# Patient Record
Sex: FEMALE | Race: ASIAN | NOT HISPANIC OR LATINO | Employment: UNEMPLOYED | URBAN - METROPOLITAN AREA
[De-identification: names, ages, dates, MRNs, and addresses within clinical notes are randomized per-mention and may not be internally consistent; named-entity substitution may affect disease eponyms.]

---

## 2017-03-13 ENCOUNTER — COMMUNICATION - HEALTHEAST (OUTPATIENT)
Dept: FAMILY MEDICINE | Facility: CLINIC | Age: 27
End: 2017-03-13

## 2017-03-15 ENCOUNTER — OFFICE VISIT - HEALTHEAST (OUTPATIENT)
Dept: FAMILY MEDICINE | Facility: CLINIC | Age: 27
End: 2017-03-15

## 2017-03-15 DIAGNOSIS — M54.50 ACUTE BILATERAL LOW BACK PAIN WITHOUT SCIATICA: ICD-10-CM

## 2017-03-15 DIAGNOSIS — R10.30 LOWER ABDOMINAL PAIN: ICD-10-CM

## 2017-03-15 ASSESSMENT — MIFFLIN-ST. JEOR: SCORE: 1325.29

## 2017-07-06 ENCOUNTER — OFFICE VISIT - HEALTHEAST (OUTPATIENT)
Dept: FAMILY MEDICINE | Facility: CLINIC | Age: 27
End: 2017-07-06

## 2017-07-06 DIAGNOSIS — Z34.90 PREGNANCY, UNSPECIFIED GESTATIONAL AGE: ICD-10-CM

## 2017-07-06 DIAGNOSIS — O21.9 VOMITING PREGNANCY: ICD-10-CM

## 2017-07-06 DIAGNOSIS — Z33.1 PREGNANT STATE, INCIDENTAL: ICD-10-CM

## 2017-07-06 DIAGNOSIS — N91.2 AMENORRHEA: ICD-10-CM

## 2017-07-06 ASSESSMENT — MIFFLIN-ST. JEOR: SCORE: 1316.22

## 2017-07-11 ENCOUNTER — COMMUNICATION - HEALTHEAST (OUTPATIENT)
Dept: FAMILY MEDICINE | Facility: CLINIC | Age: 27
End: 2017-07-11

## 2017-07-18 ENCOUNTER — HOSPITAL ENCOUNTER (OUTPATIENT)
Dept: ULTRASOUND IMAGING | Facility: HOSPITAL | Age: 27
Discharge: HOME | End: 2017-07-18
Attending: FAMILY MEDICINE

## 2017-07-19 ENCOUNTER — COMMUNICATION - HEALTHEAST (OUTPATIENT)
Dept: FAMILY MEDICINE | Facility: CLINIC | Age: 27
End: 2017-07-19

## 2017-08-08 ENCOUNTER — PRENATAL OFFICE VISIT - HEALTHEAST (OUTPATIENT)
Dept: FAMILY MEDICINE | Facility: CLINIC | Age: 27
End: 2017-08-08

## 2017-08-08 DIAGNOSIS — Z34.90 PREGNANCY, UNSPECIFIED GESTATIONAL AGE: ICD-10-CM

## 2017-08-08 DIAGNOSIS — Z34.90 PREGNANCY: ICD-10-CM

## 2017-08-08 DIAGNOSIS — Z34.81 ENCOUNTER FOR SUPERVISION OF OTHER NORMAL PREGNANCY IN FIRST TRIMESTER: ICD-10-CM

## 2017-08-08 LAB — HIV 1+2 AB+HIV1 P24 AG SERPL QL IA: NEGATIVE

## 2017-08-08 ASSESSMENT — MIFFLIN-ST. JEOR: SCORE: 1318.49

## 2017-08-09 LAB
HBV SURFACE AG SERPL QL IA: NEGATIVE
SYPHILIS RPR SCREEN - HISTORICAL: NORMAL

## 2017-08-11 ENCOUNTER — AMBULATORY - HEALTHEAST (OUTPATIENT)
Dept: FAMILY MEDICINE | Facility: CLINIC | Age: 27
End: 2017-08-11

## 2017-08-11 DIAGNOSIS — Z34.90 PREGNANCY: ICD-10-CM

## 2017-09-07 ENCOUNTER — PRENATAL OFFICE VISIT - HEALTHEAST (OUTPATIENT)
Dept: FAMILY MEDICINE | Facility: CLINIC | Age: 27
End: 2017-09-07

## 2017-09-07 ENCOUNTER — RECORDS - HEALTHEAST (OUTPATIENT)
Dept: ADMINISTRATIVE | Facility: OTHER | Age: 27
End: 2017-09-07

## 2017-09-07 DIAGNOSIS — Z23 NEED FOR IMMUNIZATION AGAINST INFLUENZA: ICD-10-CM

## 2017-09-07 DIAGNOSIS — Z3A.17 17 WEEKS GESTATION OF PREGNANCY: ICD-10-CM

## 2017-09-07 ASSESSMENT — MIFFLIN-ST. JEOR: SCORE: 1318.49

## 2017-09-28 ENCOUNTER — HOSPITAL ENCOUNTER (OUTPATIENT)
Dept: ULTRASOUND IMAGING | Facility: HOSPITAL | Age: 27
Discharge: HOME | End: 2017-09-28
Attending: FAMILY MEDICINE

## 2017-09-28 DIAGNOSIS — Z3A.17 17 WEEKS GESTATION OF PREGNANCY: ICD-10-CM

## 2017-10-05 ENCOUNTER — PRENATAL OFFICE VISIT - HEALTHEAST (OUTPATIENT)
Dept: FAMILY MEDICINE | Facility: CLINIC | Age: 27
End: 2017-10-05

## 2017-10-05 DIAGNOSIS — Z34.90 PREGNANCY: ICD-10-CM

## 2017-10-05 ASSESSMENT — MIFFLIN-ST. JEOR: SCORE: 1341.17

## 2017-10-12 ENCOUNTER — HOSPITAL ENCOUNTER (OUTPATIENT)
Dept: ULTRASOUND IMAGING | Facility: HOSPITAL | Age: 27
Discharge: HOME | End: 2017-10-12
Attending: FAMILY MEDICINE

## 2017-10-12 DIAGNOSIS — Z34.90 PREGNANCY: ICD-10-CM

## 2017-11-03 ENCOUNTER — PRENATAL OFFICE VISIT - HEALTHEAST (OUTPATIENT)
Dept: FAMILY MEDICINE | Facility: CLINIC | Age: 27
End: 2017-11-03

## 2017-11-03 DIAGNOSIS — Z34.90 PREGNANCY: ICD-10-CM

## 2017-11-03 ASSESSMENT — MIFFLIN-ST. JEOR: SCORE: 1363.85

## 2017-12-12 ENCOUNTER — PRENATAL OFFICE VISIT - HEALTHEAST (OUTPATIENT)
Dept: FAMILY MEDICINE | Facility: CLINIC | Age: 27
End: 2017-12-12

## 2017-12-12 DIAGNOSIS — Z34.90 PREGNANCY: ICD-10-CM

## 2017-12-12 ASSESSMENT — MIFFLIN-ST. JEOR: SCORE: 1359.31

## 2017-12-13 LAB — SYPHILIS RPR SCREEN - HISTORICAL: NORMAL

## 2017-12-28 ENCOUNTER — PRENATAL OFFICE VISIT - HEALTHEAST (OUTPATIENT)
Dept: FAMILY MEDICINE | Facility: CLINIC | Age: 27
End: 2017-12-28

## 2017-12-28 DIAGNOSIS — Z34.90 PREGNANCY: ICD-10-CM

## 2017-12-28 ASSESSMENT — MIFFLIN-ST. JEOR: SCORE: 1364.98

## 2018-01-18 ENCOUNTER — COMMUNICATION - HEALTHEAST (OUTPATIENT)
Dept: FAMILY MEDICINE | Facility: CLINIC | Age: 28
End: 2018-01-18

## 2018-01-29 ENCOUNTER — PRENATAL OFFICE VISIT - HEALTHEAST (OUTPATIENT)
Dept: FAMILY MEDICINE | Facility: CLINIC | Age: 28
End: 2018-01-29

## 2018-01-29 DIAGNOSIS — Z34.90 PREGNANCY: ICD-10-CM

## 2018-01-29 ASSESSMENT — MIFFLIN-ST. JEOR: SCORE: 1386.53

## 2018-01-31 LAB
ALLERGIC TO PENICILLIN: NO
GP B STREP DNA SPEC QL NAA+PROBE: NEGATIVE

## 2018-02-07 ENCOUNTER — PRENATAL OFFICE VISIT - HEALTHEAST (OUTPATIENT)
Dept: FAMILY MEDICINE | Facility: CLINIC | Age: 28
End: 2018-02-07

## 2018-02-07 DIAGNOSIS — Z34.90 PREGNANCY: ICD-10-CM

## 2018-02-07 ASSESSMENT — MIFFLIN-ST. JEOR: SCORE: 1383.8

## 2018-02-13 ENCOUNTER — HOME CARE/HOSPICE - HEALTHEAST (OUTPATIENT)
Dept: HOME HEALTH SERVICES | Facility: HOME HEALTH | Age: 28
End: 2018-02-13

## 2018-02-14 ENCOUNTER — HOME CARE/HOSPICE - HEALTHEAST (OUTPATIENT)
Dept: HOME HEALTH SERVICES | Facility: HOME HEALTH | Age: 28
End: 2018-02-14

## 2018-03-27 ENCOUNTER — COMMUNICATION - HEALTHEAST (OUTPATIENT)
Dept: FAMILY MEDICINE | Facility: CLINIC | Age: 28
End: 2018-03-27

## 2018-03-27 ENCOUNTER — OFFICE VISIT - HEALTHEAST (OUTPATIENT)
Dept: FAMILY MEDICINE | Facility: CLINIC | Age: 28
End: 2018-03-27

## 2018-03-27 DIAGNOSIS — Z12.4 SCREENING FOR CERVICAL CANCER: ICD-10-CM

## 2018-03-27 DIAGNOSIS — Z30.011 ENCOUNTER FOR INITIAL PRESCRIPTION OF CONTRACEPTIVE PILLS: ICD-10-CM

## 2018-03-27 LAB
HCG UR QL: NEGATIVE
SP GR UR STRIP: 1.02 (ref 1–1.03)

## 2018-03-27 RX ORDER — SWAB
1 SWAB, NON-MEDICATED MISCELLANEOUS DAILY
Qty: 90 TABLET | Refills: 10 | Status: SHIPPED | OUTPATIENT
Start: 2018-03-27 | End: 2021-07-16

## 2018-03-27 RX ORDER — NORGESTIMATE AND ETHINYL ESTRADIOL 0.25-0.035
1 KIT ORAL DAILY
Qty: 3 PACKAGE | Refills: 4 | Status: SHIPPED | OUTPATIENT
Start: 2018-03-27 | End: 2021-07-16

## 2018-03-27 ASSESSMENT — MIFFLIN-ST. JEOR: SCORE: 1336.63

## 2018-03-28 LAB
BKR LAB AP ABNORMAL BLEEDING: NO
BKR LAB AP BIRTH CONTROL/HORMONES: NORMAL
BKR LAB AP CERVICAL APPEARANCE: NORMAL
BKR LAB AP GYN ADEQUACY: NORMAL
BKR LAB AP GYN INTERPRETATION: NORMAL
BKR LAB AP HPV REFLEX: NORMAL
BKR LAB AP LMP: NORMAL
BKR LAB AP PATIENT STATUS: NORMAL
BKR LAB AP PREVIOUS ABNORMAL: NORMAL
BKR LAB AP PREVIOUS NORMAL: 2015
HIGH RISK?: NO
PATH REPORT.COMMENTS IMP SPEC: NORMAL
RESULT FLAG (HE HISTORICAL CONVERSION): NORMAL

## 2018-03-29 ENCOUNTER — COMMUNICATION - HEALTHEAST (OUTPATIENT)
Dept: FAMILY MEDICINE | Facility: CLINIC | Age: 28
End: 2018-03-29

## 2018-09-24 ENCOUNTER — AMBULATORY - HEALTHEAST (OUTPATIENT)
Dept: FAMILY MEDICINE | Facility: CLINIC | Age: 28
End: 2018-09-24

## 2018-09-24 DIAGNOSIS — Z00.00 HEALTHY ADULT ON ROUTINE PHYSICAL EXAMINATION: ICD-10-CM

## 2019-03-08 ENCOUNTER — OFFICE VISIT - HEALTHEAST (OUTPATIENT)
Dept: FAMILY MEDICINE | Facility: CLINIC | Age: 29
End: 2019-03-08

## 2019-03-08 DIAGNOSIS — Z30.017 NEXPLANON INSERTION: ICD-10-CM

## 2019-03-08 DIAGNOSIS — Z23 NEED FOR VACCINATION: ICD-10-CM

## 2019-03-08 DIAGNOSIS — Z00.00 HEALTHY ADULT ON ROUTINE PHYSICAL EXAMINATION: ICD-10-CM

## 2019-03-08 DIAGNOSIS — Z30.017 ENCOUNTER FOR INITIAL PRESCRIPTION OF IMPLANTABLE SUBDERMAL CONTRACEPTIVE: ICD-10-CM

## 2019-03-08 LAB — HCG UR QL: NEGATIVE

## 2019-03-08 RX ORDER — ACETAMINOPHEN 325 MG/1
TABLET ORAL
Qty: 120 TABLET | Refills: 11 | Status: SHIPPED | OUTPATIENT
Start: 2019-03-08 | End: 2021-07-16

## 2019-03-08 ASSESSMENT — MIFFLIN-ST. JEOR: SCORE: 1318.49

## 2019-11-12 ENCOUNTER — AMBULATORY - HEALTHEAST (OUTPATIENT)
Dept: NURSING | Facility: CLINIC | Age: 29
End: 2019-11-12

## 2021-03-02 ENCOUNTER — OFFICE VISIT - HEALTHEAST (OUTPATIENT)
Dept: FAMILY MEDICINE | Facility: CLINIC | Age: 31
End: 2021-03-02

## 2021-03-02 DIAGNOSIS — L73.9 FOLLICULITIS: ICD-10-CM

## 2021-04-10 ENCOUNTER — AMBULATORY - HEALTHEAST (OUTPATIENT)
Dept: NURSING | Facility: CLINIC | Age: 31
End: 2021-04-10

## 2021-05-30 VITALS — WEIGHT: 145.5 LBS | BODY MASS INDEX: 26.78 KG/M2 | HEIGHT: 62 IN

## 2021-05-31 VITALS — HEIGHT: 62 IN | WEIGHT: 154 LBS | BODY MASS INDEX: 28.34 KG/M2

## 2021-05-31 VITALS — WEIGHT: 158 LBS | HEIGHT: 62 IN | BODY MASS INDEX: 29.08 KG/M2

## 2021-05-31 VITALS
HEIGHT: 62 IN | BODY MASS INDEX: 26.77 KG/M2 | BODY MASS INDEX: 26.5 KG/M2 | WEIGHT: 144 LBS | BODY MASS INDEX: 26.77 KG/M2 | WEIGHT: 144 LBS | HEIGHT: 62 IN

## 2021-05-31 VITALS — HEIGHT: 62 IN | BODY MASS INDEX: 26.41 KG/M2 | WEIGHT: 143.5 LBS

## 2021-05-31 VITALS — HEIGHT: 62 IN | BODY MASS INDEX: 29.26 KG/M2 | WEIGHT: 159 LBS

## 2021-05-31 VITALS — WEIGHT: 153 LBS | BODY MASS INDEX: 28.16 KG/M2 | HEIGHT: 62 IN

## 2021-05-31 VITALS — WEIGHT: 144 LBS | BODY MASS INDEX: 26.5 KG/M2 | HEIGHT: 62 IN

## 2021-05-31 VITALS — WEIGHT: 149 LBS | BODY MASS INDEX: 27.42 KG/M2 | HEIGHT: 62 IN

## 2021-05-31 VITALS — HEIGHT: 62 IN | BODY MASS INDEX: 28.39 KG/M2 | WEIGHT: 154.25 LBS

## 2021-06-01 VITALS — HEIGHT: 62 IN | BODY MASS INDEX: 27.23 KG/M2 | WEIGHT: 148 LBS

## 2021-06-02 VITALS — HEIGHT: 62 IN | WEIGHT: 144 LBS | BODY MASS INDEX: 26.5 KG/M2

## 2021-06-09 NOTE — PROGRESS NOTES
Assessment: /    Plan:    1. Lower abdominal pain  Urinalysis-UC if Indicated   2. Acute bilateral low back pain without sciatica  ibuprofen (ADVIL,MOTRIN) 600 MG tablet       Avoid bending twisting or leaning at the waist.  Recheck if symptoms are not improving.  Patient was seen with Nabila , Harman Ku.    Subjective:    HPI:  Harman Abbott is a 26-year-old female presenting with bilateral low back pain, and also lower abdominal pain.  Symptoms began 3 weeks ago.  She notes pain after she has been sitting for a period of time.  She has not been taking medication for this.  She has not had any injury involving the back or abdomen.    Review of Systems: No fever, dysuria, increased urinary frequency, vomiting, melena.      Current Outpatient Prescriptions   Medication Sig Dispense Refill     ARTIFICIAL TEARS, POLYVIN ALC, 1.4 % ophthalmic solution   0     condoms latex lubricated (CONDOMS-RAFFI LUBRICATED) Rachael Use as directed to prevent pregnancy 24 each 12     docusate sodium (COLACE) 100 MG capsule Take 1 capsule (100 mg total) by mouth 2 (two) times a day as needed for constipation. 10 capsule 0     lubricants (PERSONAL LUBRICANT) Soln Use vaginally for dryness 70.5 mL 0     ibuprofen (ADVIL,MOTRIN) 600 MG tablet Take 1 tablet (600 mg total) by mouth 3 (three) times a day as needed for pain. 50 tablet 5     MAPAP, ACETAMINOPHEN, 325 mg tablet TAKE 1 TABLET (325 MG TOTAL)  BY MOUTH EVERY 6 (SIX) HOURS AS NEEDED FO R PAIN. 120 tablet 11     PRENATAL VIT W-CA,FE,FA,<1 MG, (PRENATAL VITAMIN ORAL) Take 1 tablet by mouth daily.       No current facility-administered medications for this visit.          Objective:    Vitals:    03/15/17 1508   BP: 90/60   Pulse: 98   Resp: 16   Temp: 98.6  F (37  C)   SpO2: 98%       Gen:  NAD, VSS  Lungs:  normal  Heart:  normal  Abdomen:  No HSM or mass.  Slight suprapubic tenderness.  No rebound or guarding.  Back with no CVA tenderness, no midline thoracic or lumbar  spinal tenderness.  Bilateral lumbar paraspinal tenderness.  Straight leg raise normal bilateral    UA negative    ADDITIONAL HISTORY SUMMARIZED (2): None.  DECISION TO OBTAIN EXTRA INFORMATION (1): None.   RADIOLOGY TESTS (1): None.  LABS (1): UA.  MEDICINE TESTS (1): None.  INDEPENDENT REVIEW (2 each): None.     Total Data Points: 1

## 2021-06-12 NOTE — PROGRESS NOTES
Doing well.  No problems.    Quad screen drawn today  US ordered today.    Reviewed signs of pre eclampsia.    Reviewed fetal movement.  Reviewed travel plans  Influenza vaccine given today.

## 2021-06-12 NOTE — PROGRESS NOTES
PRENATAL VISIT   FIRST OBSTETRICAL EXAM - OB    Assessment / Impression     multip at 13 w 2 days.    Desires bottlefeeding  Desires quad screen.   Normal first prenatal visit at 13w2d  Discussed orientation, general information, lifestyle, nutrition, exercise,warning signs, resources, lab testing, risk screening and discussed cystic fibrosis screening with patient.  Questions answered.    Plan:     Encouraged regular physical acitivty.     Initial labs drawn.  Prenatal vitamins.  Problem list reviewed and updated.  Genetic screening test options discussed:  Patient elects quad screening  Role of ultrasound in pregnancy discussed; fetal survey: requested.  Follow up: Return in 4 weeks (on 2017).  Needs quad screen at next visit.      Subjective:    Harman Abbott is a 26 y.o.  here today for her First Obstetrical Exam.   OB History    Para Term  AB Living   3 2 2   2   SAB TAB Ectopic Multiple Live Births       2      # Outcome Date GA Lbr Wilfredo/2nd Weight Sex Delivery Anes PTL Lv   3 Current            2 Term 02/16/15 38w3d 19:32 / 00:10 5 lb 9 oz (2.523 kg) F Vag-Spont None N LYDIA   1 Term  40w0d 07:00 6 lb 6.3 oz (2.9 kg) F Vag-Spont None  LYDIA      Birth Comments: had long prodromal episode          Expected Date of Delivery: 2018, by Ultrasound    No past medical history on file.  No past surgical history on file.  Social History   Substance Use Topics     Smoking status: Never Smoker     Smokeless tobacco: Never Used     Alcohol use No     Current Outpatient Prescriptions   Medication Sig Dispense Refill     prenatal vit-iron fum-folic ac (PRENATAL VITAMIN) 27 mg iron- 0.8 mg Tab Take 1 tablet by mouth daily. 100 tablet 3     ARTIFICIAL TEARS, POLYVIN ALC, 1.4 % ophthalmic solution   0     condoms latex lubricated (CONDOMS-RAFFI LUBRICATED) Rachael Use as directed to prevent pregnancy 24 each 12     docusate sodium (COLACE) 100 MG capsule Take 1 capsule (100 mg total) by mouth 2 (two) times a  "day as needed for constipation. 10 capsule 0     lubricants (PERSONAL LUBRICANT) Soln Use vaginally for dryness 70.5 mL 0     MAPAP, ACETAMINOPHEN, 325 mg tablet TAKE 1 TABLET (325 MG TOTAL)  BY MOUTH EVERY 6 (SIX) HOURS AS NEEDED FO R PAIN. 120 tablet 11     prenat.vits,pat,min-iron-folic Tab Take 1 tablet daily 100 each 3     No current facility-administered medications for this visit.      No Known Allergies          High Risk Behavior: None    Review of Systems  General:  Denies problem  Eyes: Denies problem  Ears/Nose/Throat: Denies problem  Cardiovascular: Denies problem  Respiratory:  Denies problem  Gastrointestinal:  Denies problem, Genitourinary: Denies problem  Musculoskeletal:  Denies problem  Skin: Denies problem  Neurologic: Denies problem  Psychiatric: Denies problem  Endocrine: Denies problem  Heme/Lymphatic: Denies problem   Allergic/Immunologic: Denies problem       Objective:   Objective    Vitals:    08/08/17 1218   BP: 106/54   Pulse: 100   Resp: 16   Temp: 99.5  F (37.5  C)   TempSrc: Oral   Weight: 144 lb (65.3 kg)   Height: 5' 1.5\" (1.562 m)     Physical Exam:  General Appearance: Alert, cooperative, no distress, appears stated age  Head: Normocephalic, without obvious abnormality, atraumatic  Eyes: PERRL, conjunctiva/corneas clear, EOM's intact  Ears: Normal TM's and external ear canals, both ears  Nose: Nares normal, septum midline,mucosa normal, no drainage  Throat: Lips, mucosa, and tongue normal; teeth and gums normal  Neck: Supple, symmetrical, trachea midline, no adenopathy;  thyroid: not enlarged, symmetric, no tenderness/mass/nodules; no carotid bruit or JVD  Back: Symmetric, no curvature, ROM normal, no CVA tenderness  Lungs: Clear to auscultation bilaterally, respirations unlabored  Breasts: No breast masses, tenderness, asymmetry, or nipple discharge.  Heart: Regular rate and rhythm, S1 and S2 normal, no murmur, rub, or gallop, Abdomen: Soft, non-tender, bowel sounds active all " four quadrants,  no masses, no organomegaly  Pelvic:Normally developed genitalia with no external lesions or eruptions. Vagina and cervix show no lesions, inflammation, discharge or tenderness. No cystocele, No rectocele. Uterus appropriate size.  No adnexal mass or tenderness.  Extremities: Extremities normal, atraumatic, no cyanosis or edema  Skin: Skin color, texture, turgor normal, no rashes or lesions  Lymph nodes: Cervical, supraclavicular, and axillary nodes normal  Neurologic: Normal     Lab:   Results for orders placed or performed in visit on 07/06/17   Pregnancy, Urine   Result Value Ref Range    Pregnancy Test, Urine Positive (!) Negative

## 2021-06-13 NOTE — PROGRESS NOTES
Doing well.  No problems.  No bleeding, cramping, or lof.    Repeat US of babys face was normal.    Desires natural delivery.    Reviewed signs of  labor  Has carseat from her last baby  1 hour gct, uai, hgb, syphilis at next visit.

## 2021-06-14 NOTE — PROGRESS NOTES
1 hour done today.    Baby is moving.    No bleeding, cramping, or lof.    No painful ctx.    tdap done today.

## 2021-06-15 NOTE — PROGRESS NOTES
Harman Abbott is a 30 y.o. female who is being evaluated via a billable telephone visit.      What phone number would you like to be contacted at? [phone]  How would you like to obtain your AVS? AVS Preference: Mail a copy.    Assessment & Plan     Harman was seen today for rash.    Diagnoses and all orders for this visit:    Folliculitis  -     cephalexin (KEFLEX) 500 MG capsule; Take 1 capsule (500 mg total) by mouth 3 (three) times a day for 5 days.      Be seen at office if worse, or not resolving.             Return in about 1 year (around 3/2/2022) for Annual physical.    Kirt Garza MD  [facility]    Subjective   Harman Abbott is 30 y.o. and presents today for the following health issues   HPI     Rash began yesterday on back and neck.  Pimples.  Itchy. Spreading.  Not otherwise sick.      Current Outpatient Medications   Medication Sig Dispense Refill     acetaminophen (MAPAP, ACETAMINOPHEN,) 325 MG tablet TAKE 1 TABLET (325 MG TOTAL)  BY MOUTH EVERY 6 (SIX) HOURS AS NEEDED FO R PAIN. 120 tablet 11     ARTIFICIAL TEARS, POLYVIN ALC, 1.4 % ophthalmic solution   0     cephalexin (KEFLEX) 500 MG capsule Take 1 capsule (500 mg total) by mouth 3 (three) times a day for 5 days. 15 capsule 0     condoms latex lubricated (CONDOMS-RAFFI LUBRICATED) Rachael Use as directed to prevent pregnancy 24 each 12     docusate sodium (COLACE) 100 MG capsule Take 1 capsule (100 mg total) by mouth 2 (two) times a day as needed for constipation. 10 capsule 0     etonogestrel (NEXPLANON) 68 mg Impl implant 1 each by Subdermal route once. Lot: [id number]  Exp: 08/15/2021  NDC: 9138-4580-22       ferrous sulfate 325 (65 FE) MG tablet Take 1 tablet (325 mg total) by mouth daily. 30 tablet 1     lubricants (PERSONAL LUBRICANT) Soln Use vaginally for dryness 70.5 mL 0     norgestimate-ethinyl estradiol (SPRINTEC, 28,) 0.25-35 mg-mcg per tablet Take 1 tablet by mouth daily. 3 Package 4     prenatal vitamin  iron-folic acid 27mg-0.8mg (PRENATAL S) 27 mg iron- 800 mcg Tab tablet Take 1 tablet by mouth daily. 90 tablet 10     No current facility-administered medications for this visit.          Review of Systems  No fever, cough or rhinorrhea.      Objective       Vitals:  No vitals were obtained today due to virtual visit.    Physical Exam              Phone call duration: 8 minutes

## 2021-06-15 NOTE — PROGRESS NOTES
gbs done today.    She says she had difficulty with transportation, so she was not seen for a few weeks.    She does not have any painful ctx.  She has occasional stephan nava.    No headaches or vision changes.  No swelling.    Reviewed when to go to the hospital.    Reviewed on call coverage as I am out of town next week.    Baby is moving well.

## 2021-06-15 NOTE — PROGRESS NOTES
Some stephan nava ctx.  No bleeding, cramping, or lof.    Reviewed when to go to the hospital.    She does have her carseat.    She does have a way to get to the hospital.   Will create a paper to get her to the hospital if need be.    Reviewed baby cares including skin to skin, delayed cord clamping, encouraged breastfeeding, vitamin k, hepatitis b vaccine, eye ointment, 24 hour testing.    GBS at next visit.

## 2021-06-15 NOTE — PROGRESS NOTES
"SUBJECTIVE:  Harman Abbott is a 27 y.o. female  at 39w3d by 10 wk US. Estimated Date of Delivery: 18.  She is doing well. She denies vaginal bleeding, vaginal burning/itching/pain or unusual discharge, or urinary symptoms. Fetal movement is present. She denies regular or painful contractions.  Concerns: none  ROS  No headaches, chest pain, RUQ pain, edema.  OBJECTIVE:  Blood pressure 96/58, pulse 100, temperature 99  F (37.2  C), temperature source Oral, resp. rate 20, height 5' 1.61\" (1.565 m), weight 158 lb (71.7 kg), last menstrual period 2017, not currently breastfeeding.  Gen: Comfortable, no acute distress.  Abd: Gravid, non-tender  See OB Vitals flowsheet.  ASSESSMENT/PLAN:  - at 39w3d by 10 wk US:   Prenatal labs reviewed and Normal.   GBS status is negative  Peripartum Anesthesia: the patient does NOT desire an epidural during labor.   Post-partum Contraception: nexplanon  Breast/Bottle: bottlefeed   Reviewed plans for getting to the hospital.  RTC in 1 week or sooner with problems.     Visit completed along with assistance of Nabila .    Lucia Daily MD    "

## 2021-06-16 NOTE — PROGRESS NOTES
"S:  28 yo female who is here for a post partum visit.  She denies any emotional problems.  She is sleeping ok.  She is doing all breastfeeding.    She is eating well.  He bowels and bladder are doing well.  She has not yet had a period.   She is thinking she would like to use ocp righ now for birth control.  She would like to change to nexplanon after she has a period.  She cannot express why she would like to do it this way.    She is due for a pap smear.      O:  BP 96/64  Pulse 97  Temp 98.4  F (36.9  C) (Oral)   Resp 20  Ht 5' 1.5\" (1.562 m)  Wt 148 lb (67.1 kg)  LMP 04/30/2017 (Approximate)  SpO2 98%  Breastfeeding? Yes  BMI 27.51 kg/m2  Gen: no acute distress  Neck:  Supple, no lad, no thyromegaly or nodules.    Heart:  Regular rate and rhythm.  No m/r/g  Breast exam is normal  Lungs: cta bilaterally, no wheezes or rhonchi.  Good air inspiration  Abdomen:  No masses or organomegaly  Extremities:  No edema.     Skin:  No rashes  Genitourinary Exam:   Vulva: normal skin.  No lesions noted.  Nontender.    Urethral meatus: normal size and location, no lesions or discharge   Urethra: no tenderness or masses   Bladder: no fullness or tenderness   Vagina: normal appearance, physiologic discharge. No evidence of cystocele or rectocele.   Cervix: normal appearance, no lesions, no cervical motion tenderness   Uterus: normal size and position, mobile, non-tender   Pap smear obtained: yes  Adnexa: no palpable masses bilaterally   Rectal exam: deferred   Psych:  Normal.  No depression noted.        Patient Active Problem List   Diagnosis     Normal spontaneous vaginal delivery     Postpartum hemorrhage     Lactating mother     Current Outpatient Prescriptions on File Prior to Visit   Medication Sig Dispense Refill     ARTIFICIAL TEARS, POLYVIN ALC, 1.4 % ophthalmic solution   0     condoms latex lubricated (CONDOMS-RAFFI LUBRICATED) Rachael Use as directed to prevent pregnancy 24 each 12     docusate sodium (COLACE) " 100 MG capsule Take 1 capsule (100 mg total) by mouth 2 (two) times a day as needed for constipation. 10 capsule 0     ferrous sulfate 325 (65 FE) MG tablet Take 1 tablet (325 mg total) by mouth daily. 30 tablet 1     ibuprofen (ADVIL,MOTRIN) 800 MG tablet Take 1 tablet (800 mg total) by mouth every 8 (eight) hours as needed for pain. 21 tablet 0     lubricants (PERSONAL LUBRICANT) Soln Use vaginally for dryness 70.5 mL 0     MAPAP, ACETAMINOPHEN, 325 mg tablet TAKE 1 TABLET (325 MG TOTAL)  BY MOUTH EVERY 6 (SIX) HOURS AS NEEDED FO R PAIN. 120 tablet 11     prenatal vitamin iron-folic acid 27mg-0.8mg (PRENATAL S) 27 mg iron- 800 mcg Tab tablet   10     No current facility-administered medications on file prior to visit.           Recent Results (from the past 48 hour(s))   Pregnancy, Urine    Collection Time: 03/27/18  2:10 PM   Result Value Ref Range    Pregnancy Test, Urine Negative Negative    Specific Gravity, UA 1.020 1.001 - 1.030         Assessment/Plan:  1. Contraception  After various forms of birth control were discussed today, including but not limited to pills, patches, depo, iud's, and nexplanon, the patient elected to go with ortho ra, but then changed her mind to pills.  The risks and benefits of this form on contraception were discussed, including risk of failure, risk of decreased milk production, risk of blood clots, and high blood pressure.    If she is unable to to remember to take ocp, advised to return for nexplanon.      - Pregnancy, Urine  - norgestimate-ethinyl estradiol (SPRINTEC, 28,) 0.25-35 mg-mcg per tablet; Take 1 tablet by mouth daily.  Dispense: 3 Package; Refill: 4    2. Encounter for initial prescription of contraceptive pills      3. Postpartum care and examination  Advised to continue her pnv as she suffered a post partum hemorrhage.  Advised a high iron diet.    Reviewed exercise.  Reviewed signs of post partum depression .   Follow up in 1 year or prn.    utd on vaccines  Pap  smear done today.      The entire conversation today was conducted through the use of a professional .        Ashleigh Carpio   3/27/2018 2:31 PM

## 2021-06-24 NOTE — PROGRESS NOTES
nexplanon insertion  She is here for contraception.  She is currently using the birth control pill.  She would like to switch to something different.  She would like some more information about longer acting forms of birth control.  She is otherwise doing well.  She denies any history of mood problems with birth control.  She denies any new family history of blood clots.  She is not interested in getting pregnant at this time.  After various forms of birth control were discussed today, including but not limited to pills, patches, depo, iud's, and nexplanon, the patient elected to go with nexplanon.  The risks and benefits of this form on contraception were discussed.        Pre-operative Diagnosis: desires contraception    Post-operative Diagnosis: s/p nexplanon insertion    Indications: contraception    Procedure Details   Urine pregnancy test was done today and result was negative.  The risks (including infection, bleeding, pain, and increased risk for difficulty with removal with weight gain, increased chance of tubal pregnancy) and benefits of the procedure were explained to the patient and Written informed consent was obtained.    Pt is right handed, so the left arm was prepped.  The area was numbed with lidocaine, and the nexplanon was placed in the usual fashion. A pressure bandage was placed.   Patient tolerated procedure well.  No complications.  No ebl.      nexplanon  Lot #Lot: [id number] Exp: 08/15/2021 NDC: 0967-6799-42    Condition:  Stable    Complications:  None    Plan:    The patient was advised to call for any problems. She was advised to use OTC analgesics as needed for mild to moderate pain.     The entire conversation today was conducted through the use of a professional .

## 2021-07-05 ENCOUNTER — COMMUNICATION - HEALTHEAST (OUTPATIENT)
Dept: SCHEDULING | Facility: CLINIC | Age: 31
End: 2021-07-05

## 2021-07-05 ENCOUNTER — HOSPITAL ENCOUNTER (EMERGENCY)
Dept: EMERGENCY MEDICINE | Facility: HOSPITAL | Age: 31
Discharge: HOME | End: 2021-07-05
Attending: STUDENT IN AN ORGANIZED HEALTH CARE EDUCATION/TRAINING PROGRAM
Payer: COMMERCIAL

## 2021-07-05 DIAGNOSIS — K80.20 GALLSTONES: ICD-10-CM

## 2021-07-05 DIAGNOSIS — R10.10 PAIN OF UPPER ABDOMEN: ICD-10-CM

## 2021-07-05 LAB
ALBUMIN SERPL-MCNC: 4.2 G/DL (ref 3.5–5)
ALP SERPL-CCNC: 82 U/L (ref 45–120)
ALT SERPL W P-5'-P-CCNC: 23 U/L (ref 0–45)
ANION GAP SERPL CALCULATED.3IONS-SCNC: 9 MMOL/L (ref 5–18)
AST SERPL W P-5'-P-CCNC: 26 U/L (ref 0–40)
BILIRUB DIRECT SERPL-MCNC: <0.1 MG/DL
BILIRUB SERPL-MCNC: 0.3 MG/DL (ref 0–1)
BUN SERPL-MCNC: 11 MG/DL (ref 8–22)
CALCIUM SERPL-MCNC: 8.4 MG/DL (ref 8.5–10.5)
CHLORIDE BLD-SCNC: 110 MMOL/L (ref 98–107)
CO2 SERPL-SCNC: 19 MMOL/L (ref 22–31)
CREAT SERPL-MCNC: 0.68 MG/DL (ref 0.6–1.1)
ERYTHROCYTE [DISTWIDTH] IN BLOOD BY AUTOMATED COUNT: 13.2 % (ref 11–14.5)
GFR SERPL CREATININE-BSD FRML MDRD: >60 ML/MIN/1.73M2
GLUCOSE BLD-MCNC: 103 MG/DL (ref 70–125)
HCT VFR BLD AUTO: 41.8 % (ref 35–47)
HGB BLD-MCNC: 13 G/DL (ref 12–16)
LACTATE SERPL-SCNC: 1.4 MMOL/L (ref 0.7–2)
LIPASE SERPL-CCNC: 39 U/L (ref 0–52)
MCH RBC QN AUTO: 27 PG (ref 27–34)
MCHC RBC AUTO-ENTMCNC: 31.1 G/DL (ref 32–36)
MCV RBC AUTO: 87 FL (ref 80–100)
PLATELET # BLD AUTO: 297 THOU/UL (ref 140–440)
PMV BLD AUTO: 11 FL (ref 8.5–12.5)
POTASSIUM BLD-SCNC: 3.8 MMOL/L (ref 3.5–5)
PROT SERPL-MCNC: 8.1 G/DL (ref 6–8)
RBC # BLD AUTO: 4.81 MILL/UL (ref 3.8–5.4)
SODIUM SERPL-SCNC: 138 MMOL/L (ref 136–145)
WBC: 7.6 THOU/UL (ref 4–11)

## 2021-07-05 NOTE — ED TRIAGE NOTES
ED Triage Notes by Oksana Stock RN at 7/5/2021  7:14 AM     Author: Oksana Stock RN Service: -- Author Type: Registered Nurse    Filed: 7/5/2021  7:15 AM Date of Service: 7/5/2021  7:14 AM Status: Signed    : Oksana Stock RN (Registered Nurse)       Patient presents here for abdominal pain that has persisted since last week, becoming progressively worse. She locates her pain to the upper midline quadrant.

## 2021-07-05 NOTE — ED NOTES
ED Notes by Claudia Sinha RN at 7/5/2021  9:37 AM     Author: Claudia Sinha RN Service: -- Author Type: Registered Nurse    Filed: 7/5/2021  9:38 AM Date of Service: 7/5/2021  9:37 AM Status: Addendum    : Claudia Sinha RN (Registered Nurse)    Related Notes: Original Note by Claudia Sinha RN (Registered Nurse) filed at 7/5/2021  9:37 AM       Pt returned from US. No needs at this time. Pt reports she eats spicy food daily. Educated pt on foods to avoid with reflux. Awaiting US results. S/o at bedside.

## 2021-07-05 NOTE — TELEPHONE ENCOUNTER
"Telephone Encounter by Ene Navarro RN at 7/5/2021  6:31 AM     Author: Ene Navarro RN Service: -- Author Type: Registered Nurse    Filed: 7/5/2021  6:49 AM Encounter Date: 7/5/2021 Status: Signed    : Ene Navarro RN (Registered Nurse)       Spouse calling.    Connected to [employer]  through [facility] Language line.    Patient reporting abdominal pain starting \"a couple of weeks ago.\" Pain started as intermittent pain.  Pain is increasing this morning. Patient stating nothing is making pain worse or better, just constant.    Patient reporting pain \"is between breast bone shooting up to throat.\"  Describes as severe pain, burning, constant this morning. Stating it is hard to breathe due to pain. Denies feeling short of breath.    Patient stating she has not taken anything for pain.     Disposition to see in Emergency Room.    Patient will go to [facility] ED now.    Ene Navarro RN  [facility]    COVID 19 Nurse Triage Plan/Patient Instructions    Please be aware that novel coronavirus (COVID-19) may be circulating in the community. If you develop symptoms such as fever, cough, or SOB or if you have concerns about the presence of another infection including coronavirus (COVID-19), please contact your health care provider or visit  [email or web address].    Disposition/Instructions    ED Visit recommended. Follow protocol based instructions.      Bring Your Own Device:  Please also bring your smart device(s) (smart phones, tablets, laptops) and their charging cables for your personal use and to communicate with your care team during your visit.      Thank you for taking steps to prevent the spread of this virus.  o Limit your contact with others.  o Wear a simple mask to cover your cough.  o Wash your hands well and often.    Resources    [facility]: About COVID-19: [email or web address]    CDC: What to Do If You're Sick: " www.cdc.gov/coronavirus/2019-ncov/about/steps-when-sick.html    CDC: Ending Home Isolation: www.cdc.gov/coronavirus/2019-ncov/hcp/disposition-in-home-patients.html     CDC: Caring for Someone: www.cdc.gov/coronavirus/2019-ncov/if-you-are-sick/care-for-someone.html     [facility]: Interim Guidance for Hospital Discharge to Home: www.health.[email or web address].[place]./diseases/coronavirus/hcp/hospdischarge.pdf    [facility] clinical trials (COVID-19 research studies): [email or web address]     Below are the COVID-19 hotlines at the [place] Department of Health ([facility]). Interpreters are available.   o For health questions: Call 964-264-3452 or 1-171.926.8287 (7 a.m. to 7 p.m.)  o For questions about schools and childcare: Call 595-428-4791 or 1-975.320.2246 (7 a.m. to 7 p.m.)       Reason for Disposition  ? SEVERE chest pain    Additional Information  ? Negative: Severe difficulty breathing (e.g., struggling for each breath, speaks in single words)  ? Negative: Difficult to awaken or acting confused (e.g., disoriented, slurred speech)  ? Negative: Shock suspected (e.g., cold/pale/clammy skin, too weak to stand, low BP, rapid pulse)  ? Negative: [1] Chest pain lasts > 5 minutes AND [2] age > 45  ? Negative: [1] Chest pain lasts > 5 minutes AND [2] age > 30 AND [3] one or more cardiac risk factors (e.g., diabetes, high blood pressure, high cholesterol, smoker, or strong family history of heart disease)  ? Negative: [1] Chest pain lasts > 5 minutes AND [2] history of heart disease (i.e., angina, heart attack, heart failure, bypass surgery, takes nitroglycerin)  ? Negative: [1] Chest pain lasts > 5 minutes AND [2] described as crushing, pressure-like, or heavy  ? Negative: Passed out (i.e., lost consciousness, collapsed and was not responding)  ? Negative: Heart beating < 50 beats per minute OR > 140 beats per minute  ? Negative: Visible sweat on face or sweat dripping down face  ? Negative: Sounds like a  life-threatening emergency to the triager  ? Negative: Followed a chest injury    Protocols used: CHEST PAIN-A-AH

## 2021-07-05 NOTE — ED PROVIDER NOTES
ED Provider Notes by Adam Avilez MD at 7/5/2021  7:18 AM     Author: Adam Avilez MD Service: Emergency Medicine Author Type: Physician    Filed: 7/5/2021 11:35 AM Date of Service: 7/5/2021  7:18 AM Status: Signed    : Adam Avilez MD (Physician)       EMERGENCY DEPARTMENT ENCOUNTER       ED Course & Medical Decision Making     7:20 AM I met with the patient to gather history and perform my exam; emergency department course and treatment discussed. PPE: N95 and glasses.  9:57 AM I rechecked the patient to discuss results and disposition. Discharge instructions, outpatient follow up, supportive cares, and reasons to return to the emergency department discussed. Patient is agreeable with this.    Final Impression  30 y.o. female presents for evaluation of 1 week of constant abdominal pain, described as burning, worse with spicy foods.  Endorsing sour taste in her mouth.  Denies being on heartburn medications at home.  Initial exam patient in no acute distress, has some mild upper abdominal pain, negative Moody sign, pain is primarily in the epigastric region.  Patient given IV famotidine and GI cocktail and states that her pain greatly improved, but not completely resolved.  Labs normal including LFTs and lipase, however right upper quadrant ultrasound does show a solitary gallstone at the neck, appears immobile.  Could potentially be causing some symptoms, though if it were the gallstone pain would likely not improve so significantly with famotidine and GI cocktail.  Discussed presumptive diagnosis of GERD, plan for 1 month trial of omeprazole to be taken daily at home, if symptoms greatly improved should talk to her doctor about staying on this medication long-term.  If symptoms fail to improve, should follow-up in the surgery clinic to discuss possible cholecystectomy.  If symptoms acutely worsen, return to the ED for reevaluation as the stone could later cause  problems.  Patient expressed understanding and agreement of the plan.  A Yesenia Dahl  was used for initial and subsequent reevaluation's, all questions answered.    Prior to making a final disposition on this patient the results of patient's tests and other diagnostic studies were discussed with the patient. All questions were answered. Patient expressed understanding of the plan and was amenable to it.    Medications   famotidine 40 mg injection (40 mg Intravenous Given 7/5/21 6744)   aluminum-magnesium hydroxide-simethicone 15 mL, viscous lidocaine HC 15 mL (GI COCKTAIL) (30 mL Oral Given 7/5/21 7190)       Final Impression     1. Pain of upper abdomen    2. Gallstones        Chief Complaint     Chief Complaint   Patient presents with   ? Abdominal Pain       Patient presents here for abdominal pain that has persisted since last week, becoming progressively worse. She locates her pain to the upper midline quadrant.       RICHARD Abbott is a 30 y.o. female no pertinent medical history, who presents for evaluation of abdominal pain.    Patient reports one week of constant abdominal pain over her sternum. Pain is burning and shoots through to her back. She has not had pain like this before. Pain does not change with lying flat. Patient endorses associated sour taste in her mouth. She has experienced heartburn before and is not on medications at home for this. Patient denies chance of pregnancy (she has a Nexplanon implant).     used: Yes (Yesenia) Language: Shruthi Nina am serving as a scribe to document services personally performed by Dr. Adam Avilez MD, based on my observation and the provider's statements to me. I, Dr. Adam Avilez MD attest that Shruthi Taylor is acting in a scribe capacity, has observed my performance of the services and has documented them in accordance with my direction.    Past Medical History     History reviewed. No pertinent past  medical history.  Relevant past surgical history reviewed with patient, unless otherwise stated in HPI, history not pertinent to this visit.  History reviewed. No pertinent family history.   Social History     Tobacco Use   ? Smoking status: Never Smoker   ? Smokeless tobacco: Never Used   ? Tobacco comment: no passive exposure   Substance Use Topics   ? Alcohol use: No   ? Drug use: No       Relevant past medical, surgical, family and social history as documented above, has been reviewed and discussed with patient. No changes or additions, unless otherwise noted in the HPI.    Current Medications     Current Discharge Medication List      START taking these medications    Details   omeprazole (PRILOSEC) 20 MG capsule Take 1 capsule (20 mg total) by mouth daily before breakfast.  Qty: 30 capsule, Refills: 0    Associated Diagnoses: Pain of upper abdomen         CONTINUE these medications which have NOT CHANGED    Details   acetaminophen (MAPAP, ACETAMINOPHEN,) 325 MG tablet TAKE 1 TABLET (325 MG TOTAL)  BY MOUTH EVERY 6 (SIX) HOURS AS NEEDED FO R PAIN.  Qty: 120 tablet, Refills: 11    Associated Diagnoses: Healthy adult on routine physical examination      ARTIFICIAL TEARS, POLYVIN ALC, 1.4 % ophthalmic solution Refills: 0      condoms latex lubricated (CONDOMS-RAFFI LUBRICATED) Rachael Use as directed to prevent pregnancy  Qty: 24 each, Refills: 12    Associated Diagnoses: Contraception      docusate sodium (COLACE) 100 MG capsule Take 1 capsule (100 mg total) by mouth 2 (two) times a day as needed for constipation.  Qty: 10 capsule, Refills: 0    Associated Diagnoses: Normal delivery      etonogestrel (NEXPLANON) 68 mg Impl implant 1 each by Subdermal route once. Lot: [id number]  Exp: 08/15/2021  NDC: 9917-5757-07      ferrous sulfate 325 (65 FE) MG tablet Take 1 tablet (325 mg total) by mouth daily.  Qty: 30 tablet, Refills: 1      lubricants (PERSONAL LUBRICANT) Soln Use vaginally for dryness  Qty: 70.5 mL, Refills: 0     Associated Diagnoses: Postpartum examination following vaginal delivery; Breastfeeding (infant)      norgestimate-ethinyl estradiol (SPRINTEC, 28,) 0.25-35 mg-mcg per tablet Take 1 tablet by mouth daily.  Qty: 3 Package, Refills: 4    Associated Diagnoses: Contraception      prenatal vitamin iron-folic acid 27mg-0.8mg (PRENATAL S) 27 mg iron- 800 mcg Tab tablet Take 1 tablet by mouth daily.  Qty: 90 tablet, Refills: 10             Allergies     No Known Allergies    Review of Systems     Constitutional: Denies fever, chills, unintentional weight loss or fatigue   Eyes: Denies visual changes or discharge  HENT: Denies sore throat, ear pain or neck pain  Respiratory: Denies cough or shortness of breath    Cardiovascular: Denies chest pain, palpitations or leg swelling  GI: Denies nausea, vomiting, or dark, bloody stools. Positive for abdominal pain  : Denies hematuria, dysuria, or flank pain  Musculoskeletal: Denies any new back pain or new muscle/joint pains  Skin: Denies rashes or wound  Neurologic: Denies current headache, new weakness, focal weakness, or sensory changes    Psychiatric: Denies depression, suicidal ideation or homicidal ideation    Remainder of systems reviewed, unless noted in HPI all others negative.    Physical Exam     /74   Pulse 81   Temp 98.8  F (37.1  C) (Oral)   Resp 16   Wt 155 lb (70.3 kg)   SpO2 98%   BMI 28.81 kg/m    Constitutional: Awake, alert, in no acute distress  Head: Normocephalic, atraumatic.  ENT: Mucous membranes moist.   Eyes: PERRL, EOMI, Conjunctiva normal  Respiratory: Respirations even, unlabored. Lungs clear to ascultation bilaterally, in no acute respiratory distress.  Cardiovascular: Regular rate and rhythm. +2 radial pulses, equal bilaterally. No pitting edema.   GI: Abdomen soft, very mild epigastric pain. No guarding or rebound.  Negative Moody sign. bowel sounds intact on all 4 quadrants.   : No CVA tenderness.    Musculoskeletal: Moves all 4  extremities equally, strength symmetrical on bilateral uppers and lowers.  Integument: Warm, dry.   Neurologic: Alert & oriented x 3. Normal speech. Grossly normal motor and sensory function. No focal deficits noted.  Psychiatric: Normal mood and affect. Normal judgement.    Labs     Results for orders placed or performed during the hospital encounter of 07/05/21   HM2(CBC w/o Differential)   Result Value Ref Range    WBC 7.6 4.0 - 11.0 thou/uL    RBC 4.81 3.80 - 5.40 mill/uL    Hemoglobin 13.0 12.0 - 16.0 g/dL    Hematocrit 41.8 35.0 - 47.0 %    MCV 87 80 - 100 fL    MCH 27.0 27.0 - 34.0 pg    MCHC 31.1 (L) 32.0 - 36.0 g/dL    RDW 13.2 11.0 - 14.5 %    Platelets 297 140 - 440 thou/uL    MPV 11.0 8.5 - 12.5 fL   Basic Metabolic Panel   Result Value Ref Range    Sodium 138 136 - 145 mmol/L    Potassium 3.8 3.5 - 5.0 mmol/L    Chloride 110 (H) 98 - 107 mmol/L    CO2 19 (L) 22 - 31 mmol/L    Anion Gap, Calculation 9 5 - 18 mmol/L    Glucose 103 70 - 125 mg/dL    Calcium 8.4 (L) 8.5 - 10.5 mg/dL    BUN 11 8 - 22 mg/dL    Creatinine 0.68 0.60 - 1.10 mg/dL    GFR MDRD Af Amer >60 >60 mL/min/1.73m2    GFR MDRD Non Af Amer >60 >60 mL/min/1.73m2   Hepatic Profile   Result Value Ref Range    Bilirubin, Total 0.3 0.0 - 1.0 mg/dL    Bilirubin, Direct <0.1 <=0.5 mg/dL    Protein, Total 8.1 (H) 6.0 - 8.0 g/dL    Albumin 4.2 3.5 - 5.0 g/dL    Alkaline Phosphatase 82 45 - 120 U/L    AST 26 0 - 40 U/L    ALT 23 0 - 45 U/L   Lipase   Result Value Ref Range    Lipase 39 0 - 52 U/L   Lactic Acid   Result Value Ref Range    Lactic Acid 1.4 0.7 - 2.0 mmol/L     Radiology     I have ordered and reviewed the following imaging exams. Upon independent review of the images and radiology reads, I agree with the reads documented below.    Us Abdomen Limited    Result Date: 7/5/2021  EXAM: US ABDOMEN LIMITED LOCATION: [facility] DATE/TIME: 7/5/2021 9:25 AM INDICATION: Right upper quadrant abdominal pain with tenderness.  COMPARISON: None. TECHNIQUE: Limited abdominal ultrasound. FINDINGS: GALLBLADDER: There are multiple gallstones within the gallbladder including a 1 cm nonmobile stone in the gallbladder neck. No gallbladder wall thickening or pericholecystic fluid is seen. BILE DUCTS: No biliary dilatation. The common duct measures 6 mm. LIVER: Normal parenchyma with smooth contour. No focal mass. Normal hepatopedal flow in the main portal vein. RIGHT KIDNEY: Limited imaging of the right kidney appears normal. PANCREAS: The visualized portions are normal. No ascites.     1.  Cholelithiasis including a nonmobile gallstone in the gallbladder neck without sonographic findings of acute cholecystitis or biliary dilatation. 2.  Otherwise normal hepatobiliary ultrasound.        Adam Avilez MD  07/05/21 7025

## 2021-07-05 NOTE — ED NOTES
ED Notes by Claudia Sinha, RN at 7/5/2021  8:58 AM     Author: Claudia Sinha RN Service: -- Author Type: Registered Nurse    Filed: 7/5/2021  8:58 AM Date of Service: 7/5/2021  8:58 AM Status: Signed    : Claudia Sinha, RN (Registered Nurse)       Pt to US with EDT

## 2021-07-06 VITALS — WEIGHT: 155 LBS | BODY MASS INDEX: 28.81 KG/M2

## 2021-07-14 PROBLEM — Z34.90 PREGNANT: Status: RESOLVED | Noted: 2018-02-11 | Resolved: 2018-02-11

## 2021-07-16 ENCOUNTER — OFFICE VISIT (OUTPATIENT)
Dept: FAMILY MEDICINE | Facility: CLINIC | Age: 31
End: 2021-07-16
Payer: COMMERCIAL

## 2021-07-16 VITALS
DIASTOLIC BLOOD PRESSURE: 76 MMHG | HEART RATE: 85 BPM | WEIGHT: 155.75 LBS | SYSTOLIC BLOOD PRESSURE: 111 MMHG | HEIGHT: 62 IN | TEMPERATURE: 97.9 F | BODY MASS INDEX: 28.66 KG/M2

## 2021-07-16 DIAGNOSIS — Z97.5 NEXPLANON IN PLACE: ICD-10-CM

## 2021-07-16 DIAGNOSIS — K80.20 CALCULUS OF GALLBLADDER WITHOUT CHOLECYSTITIS WITHOUT OBSTRUCTION: ICD-10-CM

## 2021-07-16 DIAGNOSIS — R10.10 PAIN OF UPPER ABDOMEN: Primary | ICD-10-CM

## 2021-07-16 DIAGNOSIS — K21.00 GASTROESOPHAGEAL REFLUX DISEASE WITH ESOPHAGITIS WITHOUT HEMORRHAGE: ICD-10-CM

## 2021-07-16 PROCEDURE — 99214 OFFICE O/P EST MOD 30 MIN: CPT | Performed by: PHYSICIAN ASSISTANT

## 2021-07-16 RX ORDER — POLYETHYLENE GLYCOL 400 AND PROPYLENE GLYCOL 4; 3 MG/ML; MG/ML
SOLUTION/ DROPS OPHTHALMIC
COMMUNITY
Start: 2021-05-18 | End: 2021-07-16

## 2021-07-16 ASSESSMENT — MIFFLIN-ST. JEOR: SCORE: 1373.6

## 2021-07-16 NOTE — PROGRESS NOTES
"  Subjective:    Harman Abbott is a 30 year old female who presents with chief complaint of ER follow-up for gallbladder stone.  She was seen at the ER about 2 weeks ago for abdominal pain.  Pain and been present for about a week.  She was found to have a nonobstructing gallstone 1 cm nonmobile.  Plan was to have her take omeprazole for a month and monitor.  If symptoms resolved with symmetric omeprazole continue that.  If omeprazole is not helpful, or if pain worsens, referral to surgery.  Patient says she is taking omeprazole she thinks it is working well.  She is also trying to avoid spicy foods.  History of Nexplanon placed on 3/18/2019.      I reviewed ER note from 7/5/2021.  I reviewed ultrasound report from that visit.  I reviewed the BMP, liver panel, lipase, lactic acid, CBC lab results all from that same day.  I reviewed office note for Nexplanon placement on 3/18/2019.    Patient Active Problem List   Diagnosis     Nexplanon in place (3/8/19)     Gastroesophageal reflux disease with esophagitis without hemorrhage     Calculus of gallbladder without cholecystitis without obstruction       Current Outpatient Medications:      omeprazole (PRILOSEC) 20 MG DR capsule, Take 1 capsule (20 mg) by mouth daily before breakfast, Disp: 90 capsule, Rfl: 3     etonogestrel (NEXPLANON) 68 mg Impl implant, [ETONOGESTREL (NEXPLANON) 68 MG IMPL IMPLANT] 1 each by Subdermal route once. Lot: [id number]  Exp: 08/15/2021  NDC: 6391-7520-10, Disp: , Rfl:       Objective:   Allergies:  Patient has no known allergies.    Vitals:  Vitals:    07/16/21 0720   BP: 111/76   Pulse: 85   Temp: 97.9  F (36.6  C)   Weight: 70.6 kg (155 lb 12 oz)   Height: 1.565 m (5' 1.61\")       Body mass index is 28.85 kg/m .    Vital signs reviewed.  General: Patient is alert and oriented x 3, in no apparent distress  Cardiac: regular rate and rhythm, no murmurs  Pulmonary: lungs clear to auscultation bilaterally, no crackles, rales, rhonchi, or wheezing " noted  Abdomen: Non tender to palpation, no hepatosplenomegaly, negative Moody's sign, no pain over McBurney's point, positive bowel sounds, no masses palpable        Assessment and Plan:   1. Pain of upper abdomen  2. Calculus of gallbladder without cholecystitis without obstruction  Found to have a nonobstructing 1 cm gallstone on exam at the ER.  Pain is pretty much resolved with omeprazole.  We discussed continuing to take omeprazole versus referral to surgery.  She would like to take omeprazole for several more weeks and monitor.  If not helpful, she will contact PCP.  She also knows to contact us sooner if pain is worsening.  - omeprazole (PRILOSEC) 20 MG DR capsule; Take 1 capsule (20 mg) by mouth daily before breakfast  Dispense: 90 capsule; Refill: 3    3. Gastroesophageal reflux disease with esophagitis without hemorrhage  Continue with omeprazole.  Is avoiding spicy foods.  Follow-up with PCP as needed.  - omeprazole (PRILOSEC) 20 MG DR capsule; Take 1 capsule (20 mg) by mouth daily before breakfast  Dispense: 90 capsule; Refill: 3    4. Nexplanon in place (3/8/19)    This dictation uses voice recognition software, which may contain typographical errors.

## 2021-08-17 ENCOUNTER — OFFICE VISIT (OUTPATIENT)
Dept: FAMILY MEDICINE | Facility: CLINIC | Age: 31
End: 2021-08-17
Payer: COMMERCIAL

## 2021-08-17 VITALS
HEART RATE: 97 BPM | DIASTOLIC BLOOD PRESSURE: 68 MMHG | WEIGHT: 157.5 LBS | BODY MASS INDEX: 29.17 KG/M2 | SYSTOLIC BLOOD PRESSURE: 102 MMHG | OXYGEN SATURATION: 97 % | RESPIRATION RATE: 16 BRPM | TEMPERATURE: 99.4 F

## 2021-08-17 DIAGNOSIS — Z12.4 CERVICAL CANCER SCREENING: Primary | ICD-10-CM

## 2021-08-17 DIAGNOSIS — Z00.00 HEALTHY ADULT ON ROUTINE PHYSICAL EXAMINATION: ICD-10-CM

## 2021-08-17 PROCEDURE — 99395 PREV VISIT EST AGE 18-39: CPT | Performed by: FAMILY MEDICINE

## 2021-08-17 PROCEDURE — 87624 HPV HI-RISK TYP POOLED RSLT: CPT | Performed by: FAMILY MEDICINE

## 2021-08-17 PROCEDURE — G0123 SCREEN CERV/VAG THIN LAYER: HCPCS | Performed by: FAMILY MEDICINE

## 2021-08-17 NOTE — PROGRESS NOTES
SUBJECTIVE:   CC: Harman Mercado [facility] [place] 30 [place] who presents for preventive health visit.       Patient has been advised of split billing requirements and indicates understanding: Yes  HPI  Doing well.  No problems.    No new health problems in the family.  She is doing well with her Nexplanon.       no    Today's PHQ-2 Score:   PHQ-2 ( 1999 [employer]) 8/17/2021   Q1: Little interest or pleasure in doing things 0   Q2: Feeling down, depressed or hopeless 0   PHQ-2 Score 0   Q1: Little interest or pleasure in doing things -   Q2: Feeling down, depressed or hopeless -   PHQ-2 Score -       Abuse: Current or Past (Physical, Sexual or Emotional) - No  Do you feel safe in your environment? Yes    Have you ever done Advance Care Planning? (For example, a Health Directive, POLST, or a discussion with a medical provider or your loved ones about your wishes): No, advance care planning information given to patient to review.  Patient plans to discuss their wishes with loved ones or provider.      Social History     Tobacco Use     Smoking status: Never Smoker     Smokeless tobacco: Never Used     Tobacco comment: no passive exposure   Substance Use Topics     Alcohol use: No         Alcohol Use 8/16/2021   Prescreen: >3 drinks/day or >7 drinks/week? No       Reviewed orders with patient.  Reviewed health maintenance and updated orders accordingly -       Breast Cancer Screening:  Any new diagnosis of family breast, ovarian, or bowel cancer? No    FHS-7: No flowsheet data found.      Pertinent mammograms are reviewed under the imaging tab.    History of abnormal Pap smear:   PAP / HPV 3/27/2018 4/2/2015   PAP Negative for squamous intraepithelial lesion or malignancy  Electronically signed by Feli Feirro CT (ASCP) on 3/28/2018 at  2:45 PM   Negative for squamous intraepithelial lesion or malignancy  Electronically signed by Adriana Sanchez CT (ASCP) on 4/9/2015 at 11:57 AM       Reviewed and updated as  needed this visit by clinical staff  Tobacco  Allergies  Meds              Reviewed and updated as needed this visit by Provider                Past Medical History:   Diagnosis Date     Lactating mother 2018     Normal spontaneous vaginal delivery 2015     Postpartum hemorrhage 2018      No past surgical history on file.  OB History    Para Term  AB Living   3 3 3 0 0 3   SAB TAB Ectopic Multiple Live Births   0 0 0 0 3      # Outcome Date GA Lbr Wilfredo/2nd Weight Sex Delivery Anes PTL Lv   3 Term 18 40w0d 01:54 / 00:05 2.835 kg (6 lb 4 oz) F Vag-Spont None N LYDIA      Name: KEVIN,FEMALE-PAW      Apgar1: 9  Apgar5: 9   2 Term 02/16/15 38w3d 19:32 / 00:10 2.523 kg (5 lb 9 oz) F Vag-Spont None N LYDIA      Name: Nolvia Denita      Apgar1: 8  Apgar5: 9   1 Term 08 40w0d 07:00 2.9 kg (6 lb 6.3 oz) F Vag-Spont None  LYDIA      Birth Comments: had long prodromal episode      Name: Chance Hammer       Review of Systems  Complete ros is negative.      OBJECTIVE:   LMP 2021 (Approximate)   Breastfeeding No   Physical Exam  General Appearance: Alert, cooperative, no distress, appears stated age  Head: Normocephalic, without obvious abnormality, atraumatic  Eyes: PERRL, conjunctiva/corneas clear, EOM's intact  Ears: Normal TM's and external ear canals, both ears  Nose: Nares normal, septum midline,mucosa normal, no drainage  Throat: Lips, mucosa, and tongue normal; teeth and gums normal  Neck: Supple, symmetrical, trachea midline, no adenopathy;  thyroid: not enlarged, symmetric, no tenderness/mass/nodules; no carotid bruit or JVD  Back: Symmetric, no curvature, ROM normal, no CVA tenderness  Lungs: Clear to auscultation bilaterally, respirations unlabored  Breasts: No breast masses, tenderness, asymmetry, or nipple discharge.  Heart: Regular rate and rhythm, S1 and S2 normal, no murmur, rub, or gallop,   Abdomen: Soft, non-tender, bowel sounds active all four quadrants,  no masses, no  "organomegaly.  Extremities: Extremities normal, atraumatic, no cyanosis or edema  Skin: Skin color, texture, turgor normal, no rashes or lesions.   noted.   Lymph nodes: Cervical, supraclavicular, and axillary nodes normal  Neurologic: Normal   Genitourinary Exam:   Vulva: normal skin.  No lesions noted.  Nontender.    Urethral meatus: normal size and location, no lesions or discharge   Urethra: no tenderness or masses   Bladder: no fullness or tenderness   Vagina: normal appearance, physiologic discharge. No evidence of cystocele or rectocele.   Cervix: normal appearance, no lesions, no cervical motion tenderness   Pap smear obtained: yes  Rectal exam: deferred         Diagnostic Test Results:  Labs reviewed in Epic  No results found for this or any previous visit (from the past 24 hour(s)).    ASSESSMENT/PLAN:   1. Cervical cancer screening    - Pap thin layer screen with HPV - recommended age 30 - 65 years    2.  Healthy adult on routine exam  Up-to-date on vaccinations.  Contraception is Nexplanon currently.  I reviewed when this needs to be taken out and replaced.  I reviewed her most recent diagnosis in the hospital of gallstone and GERD.  She says that she is doing well now and denies any abdominal pain.  I reviewed the signs and symptoms of a gallbladder attack.  If she has repeated attacks, then she will need follow-up with surgery.  I reviewed healthy diet including low-fat or no fried foods  I encourage regular physical exercise.    Patient has been advised of split billing requirements and indicates understanding: Yes  COUNSELING:  Reviewed preventive health counseling, as reflected in patient instructions       Regular exercise       Healthy diet/nutrition       Contraception    Estimated body mass index is 28.85 kg/m  as calculated from the following:    Height as of 7/16/21: 1.565 m (5' 1.61\").    Weight as of 7/16/21: 70.6 kg (155 lb 12 oz).    Weight management plan: Discussed healthy diet and " exercise guidelines    She reports that she has never smoked. She has never used smokeless tobacco.      Counseling Resources:  ATP IV Guidelines  Pooled Cohorts Equation Calculator  Breast Cancer Risk Calculator  BRCA-Related Cancer Risk Assessment: FHS-7 Tool  FRAX Risk Assessment  ICSI Preventive Guidelines  Dietary Guidelines for Americans, 2010  USDA's MyPlate  ASA Prophylaxis  Lung CA Screening    Ashleigh Carpio MD  [facility]

## 2021-08-20 LAB
HUMAN PAPILLOMA VIRUS 16 DNA: NEGATIVE
HUMAN PAPILLOMA VIRUS 18 DNA: NEGATIVE
HUMAN PAPILLOMA VIRUS FINAL DIAGNOSIS: NORMAL
HUMAN PAPILLOMA VIRUS OTHER HR: NEGATIVE

## 2021-08-25 LAB
BKR LAB AP GYN ADEQUACY: NORMAL
BKR LAB AP GYN INTERPRETATION: NORMAL
BKR LAB AP HPV REFLEX: NORMAL
BKR LAB AP LMP: NORMAL
BKR LAB AP PREVIOUS ABNORMAL: NORMAL
PATH REPORT.COMMENTS IMP SPEC: NORMAL
PATH REPORT.RELEVANT HX SPEC: NORMAL

## 2021-12-08 ENCOUNTER — IMMUNIZATION (OUTPATIENT)
Dept: NURSING | Facility: CLINIC | Age: 31
End: 2021-12-08
Payer: COMMERCIAL

## 2021-12-08 PROCEDURE — 91306 COVID-19,PF,MODERNA (18+ YRS BOOSTER .25ML): CPT

## 2021-12-08 PROCEDURE — 0064A COVID-19,PF,MODERNA (18+ YRS BOOSTER .25ML): CPT

## 2022-03-10 ENCOUNTER — OFFICE VISIT (OUTPATIENT)
Dept: FAMILY MEDICINE | Facility: CLINIC | Age: 32
End: 2022-03-10
Payer: COMMERCIAL

## 2022-03-10 VITALS
DIASTOLIC BLOOD PRESSURE: 58 MMHG | HEART RATE: 90 BPM | RESPIRATION RATE: 20 BRPM | WEIGHT: 153 LBS | SYSTOLIC BLOOD PRESSURE: 106 MMHG | BODY MASS INDEX: 28.34 KG/M2

## 2022-03-10 DIAGNOSIS — Z30.9 ENCOUNTER FOR CONTRACEPTIVE MANAGEMENT, UNSPECIFIED TYPE: ICD-10-CM

## 2022-03-10 DIAGNOSIS — Z30.46 ENCOUNTER FOR NEXPLANON REMOVAL: Primary | ICD-10-CM

## 2022-03-10 PROBLEM — Z97.5 NEXPLANON IN PLACE: Status: RESOLVED | Noted: 2021-07-16 | Resolved: 2022-03-10

## 2022-03-10 PROCEDURE — 11982 REMOVE DRUG IMPLANT DEVICE: CPT | Performed by: PHYSICIAN ASSISTANT

## 2022-03-10 PROCEDURE — 99213 OFFICE O/P EST LOW 20 MIN: CPT | Mod: 25 | Performed by: PHYSICIAN ASSISTANT

## 2022-03-10 RX ORDER — LIDOCAINE HYDROCHLORIDE AND EPINEPHRINE 10; 10 MG/ML; UG/ML
2.5 INJECTION, SOLUTION INFILTRATION; PERINEURAL ONCE
Status: DISCONTINUED | OUTPATIENT
Start: 2022-03-10 | End: 2022-08-24

## 2022-03-10 RX ORDER — NORELGESTROMIN AND ETHINYL ESTRADIOL 35; 150 UG/MG; UG/MG
PATCH TRANSDERMAL
Qty: 9 PATCH | Refills: 3 | Status: SHIPPED | OUTPATIENT
Start: 2022-03-10 | End: 2022-07-14

## 2022-03-10 NOTE — PROGRESS NOTES
Subjective:    Harman Mercado [facility] is a 31 year old female who presents for Nexplanon removal.  Nexplanon has just .  She would like it removed today.  She would like to try something else for birth control.  We reviewed other forms of birth control including pills, patch, Depo-Provera shot, IUD.  After discussion, she would like to try birth control patches.  Risks, benefits, possible side effects of patches were discussed with patient.  We reviewed appropriate use.  All of her questions were answered.  No history of hypertension, no history of blood clots in her body, non-smoker.    Patient Active Problem List   Diagnosis     Nexplanon in place (3/8/19)     Gastroesophageal reflux disease with esophagitis without hemorrhage     Calculus of gallbladder without cholecystitis without obstruction       Current Outpatient Medications:      norelgestromin-ethinyl estradiol (ORTHO EVRA) 150-35 MCG/24HR patch, Remove old patch and apply new patch onto the skin once a week for 3 weeks (21 days). Do not wear patch week 4 (days 22-28), then repeat., Disp: 9 patch, Rfl: 3    Current Facility-Administered Medications:      lidocaine 1% with EPINEPHrine 1:100,000 injection 2.5 mL, 2.5 mL, Injection, Once, Sybil Bertrand PA-C      Objective:   Allergies:  Patient has no known allergies.    /58 (BP Location: Left arm, Patient Position: Sitting, Cuff Size: Adult Regular)   Pulse 90   Resp 20   Wt 69.4 kg (153 lb)   LMP  (LMP Unknown)   BMI 28.34 kg/m    Body mass index is 28.34 kg/m .    General: Alert and oriented x 3, in no apparent distress    Procedure:  Left upper inner arm was adequately anesthetized with 2.5 cc of lidocaine with Epi.  Then, using sterile technique, 5 mm incision was made with an 11 blade.  Nexplanon was palpated subcutaneously and removed with a hemostat clamp.  Incision site was closed with steri-strips and wrapped with a pressure bandage.  Patient was neurovascularly intact after  exam.  Appropriate wound aftercare was dicussed with patient.         Assessment and Plan:     1. Encounter for Nexplanon removal  Nexplanon removed today.  Patient is aware it is possible to become pregnant as soon as Nexplanon is removed.  - lidocaine 1% with EPINEPHrine 1:100,000 injection 2.5 mL    2. Encounter for contraceptive management, unspecified type  After discussion of options, patient would like to try birth control patches.  Prescription sent today.  She knows to use backup birth control for the first 2 weeks of use.  - norelgestromin-ethinyl estradiol (ORTHO EVRA) 150-35 MCG/24HR patch; Remove old patch and apply new patch onto the skin once a week for 3 weeks (21 days). Do not wear patch week 4 (days 22-28), then repeat.  Dispense: 9 patch; Refill: 3      This dictation uses voice recognition software, which may contain typographical errors.

## 2022-07-14 ENCOUNTER — OFFICE VISIT (OUTPATIENT)
Dept: FAMILY MEDICINE | Facility: CLINIC | Age: 32
End: 2022-07-14
Payer: COMMERCIAL

## 2022-07-14 VITALS
HEART RATE: 94 BPM | DIASTOLIC BLOOD PRESSURE: 62 MMHG | WEIGHT: 153 LBS | HEIGHT: 62 IN | RESPIRATION RATE: 12 BRPM | TEMPERATURE: 98 F | SYSTOLIC BLOOD PRESSURE: 98 MMHG | OXYGEN SATURATION: 99 % | BODY MASS INDEX: 28.16 KG/M2

## 2022-07-14 DIAGNOSIS — R11.0 NAUSEA: ICD-10-CM

## 2022-07-14 DIAGNOSIS — N92.6 MISSED MENSES: Primary | ICD-10-CM

## 2022-07-14 DIAGNOSIS — Z32.01 PREGNANCY TEST POSITIVE: ICD-10-CM

## 2022-07-14 LAB — HCG UR QL: POSITIVE

## 2022-07-14 PROCEDURE — 99213 OFFICE O/P EST LOW 20 MIN: CPT | Performed by: FAMILY MEDICINE

## 2022-07-14 PROCEDURE — 81025 URINE PREGNANCY TEST: CPT | Performed by: FAMILY MEDICINE

## 2022-07-14 RX ORDER — ONDANSETRON 4 MG/1
4 TABLET, FILM COATED ORAL EVERY 8 HOURS PRN
Qty: 30 TABLET | Refills: 1 | Status: SHIPPED | OUTPATIENT
Start: 2022-07-14 | End: 2022-08-24

## 2022-07-14 RX ORDER — PRENATAL VIT/IRON FUM/FOLIC AC 27MG-0.8MG
1 TABLET ORAL DAILY
Qty: 90 TABLET | Refills: 3 | Status: SHIPPED | OUTPATIENT
Start: 2022-07-14 | End: 2023-04-26

## 2022-07-14 NOTE — PROGRESS NOTES
"  Assessment & Plan     Missed menses  - HCG qualitative urine    Pregnancy test positive  This is a now  around 7w with estimated LMP at the end of May 2022. Nexplanon was removed 3/2022, she did not take her OCPs. She is not excited about the pregnancy because they were not planning to have another kid, but she wants to keep the pregnancy. Start PNV, zofran for nausea per patient request, US in 1-2 weeks, IOB in 3-4 weeks.   - Prenatal Vit-Fe Fumarate-FA (PRENATAL MULTIVITAMIN W/IRON) 27-0.8 MG tablet  Dispense: 90 tablet; Refill: 3  - US OB < 14 Weeks Single    Nausea  - ondansetron (ZOFRAN) 4 MG tablet  Dispense: 30 tablet; Refill: 1      BMI:   Estimated body mass index is 28.34 kg/m  as calculated from the following:    Height as of this encounter: 1.565 m (5' 1.61\").    Weight as of this encounter: 69.4 kg (153 lb).   Weight management plan: Discussed healthy diet and exercise guidelines      Return in about 4 weeks (around 2022) for IOB, US in 1 week.    Pacheco Marino MD  [facility] LAYLA Hammer is a 31 year old, presenting for the following health issues:  Pregnancy Confirmation       HPI     Patient's LMP end of May, Nexplanon removed 3/2022. She had not been taking her birth control but did not want another child.   No cramping, bleeding, discharge.   Her last child was in 2018, history of post partum hemorrhage    Review of Systems   Constitutional, HEENT, cardiovascular, pulmonary, gi and gu systems are negative, except as otherwise noted.      Objective    BP 98/62   Pulse 94   Temp 98  F (36.7  C) (Oral)   Resp 12   Ht 1.565 m (5' 1.61\")   Wt 69.4 kg (153 lb)   LMP 2022 (Approximate)   SpO2 99%   Breastfeeding No   BMI 28.34 kg/m    Body mass index is 28.34 kg/m .  Physical Exam   GENERAL: healthy, alert and no distress  EYES: Eyes grossly normal to inspection, PERRL and conjunctivae and sclerae normal  RESP: lungs clear to auscultation - no " rales, rhonchi or wheezes  CV: regular rate and rhythm, normal S1 S2, no S3 or S4, no murmur, click or rub, no peripheral edema and peripheral pulses strong  MS: no gross musculoskeletal defects noted, no edema  SKIN: no suspicious lesions or rashes  NEURO: Normal strength and tone, mentation intact and speech normal  PSYCH: mentation appears normal, affect normal/bright    Results for orders placed or performed in visit on 07/14/22 (from the past 24 hour(s))   HCG qualitative urine   Result Value Ref Range    hCG Urine Qualitative Positive (A) Negative                   .  ..

## 2022-07-18 ENCOUNTER — HOSPITAL ENCOUNTER (OUTPATIENT)
Dept: ULTRASOUND IMAGING | Facility: HOSPITAL | Age: 32
Discharge: HOME | End: 2022-07-18
Attending: FAMILY MEDICINE | Admitting: FAMILY MEDICINE
Payer: COMMERCIAL

## 2022-07-18 DIAGNOSIS — Z32.01 PREGNANCY TEST POSITIVE: ICD-10-CM

## 2022-07-18 PROCEDURE — 76801 OB US < 14 WKS SINGLE FETUS: CPT

## 2022-08-05 NOTE — RESULT ENCOUNTER NOTE
Called to schedule appt no answer mail box not set up so unable to leave a message    [facility] 8.5.22

## 2022-08-23 LAB
ABO/RH(D): NORMAL
ANTIBODY SCREEN: NEGATIVE
SPECIMEN EXPIRATION DATE: NORMAL

## 2022-08-24 ENCOUNTER — PRENATAL OFFICE VISIT (OUTPATIENT)
Dept: FAMILY MEDICINE | Facility: CLINIC | Age: 32
End: 2022-08-24
Payer: COMMERCIAL

## 2022-08-24 VITALS
RESPIRATION RATE: 16 BRPM | SYSTOLIC BLOOD PRESSURE: 92 MMHG | WEIGHT: 151.25 LBS | BODY MASS INDEX: 27.83 KG/M2 | TEMPERATURE: 98 F | HEIGHT: 62 IN | DIASTOLIC BLOOD PRESSURE: 56 MMHG | HEART RATE: 90 BPM | OXYGEN SATURATION: 99 %

## 2022-08-24 DIAGNOSIS — O09.299 HISTORY OF POSTPARTUM HEMORRHAGE, CURRENTLY PREGNANT: ICD-10-CM

## 2022-08-24 DIAGNOSIS — Z34.90 PREGNANCY, UNSPECIFIED GESTATIONAL AGE: Primary | ICD-10-CM

## 2022-08-24 DIAGNOSIS — R11.0 NAUSEA: ICD-10-CM

## 2022-08-24 DIAGNOSIS — K80.20 CALCULUS OF GALLBLADDER WITHOUT CHOLECYSTITIS WITHOUT OBSTRUCTION: ICD-10-CM

## 2022-08-24 LAB
ALBUMIN UR-MCNC: NEGATIVE MG/DL
APPEARANCE UR: CLEAR
BILIRUB UR QL STRIP: ABNORMAL
COLOR UR AUTO: YELLOW
ERYTHROCYTE [DISTWIDTH] IN BLOOD BY AUTOMATED COUNT: 13.7 % (ref 10–15)
GLUCOSE UR STRIP-MCNC: NEGATIVE MG/DL
HCT VFR BLD AUTO: 35.1 % (ref 35–47)
HGB BLD-MCNC: 11.4 G/DL (ref 11.7–15.7)
HGB UR QL STRIP: NEGATIVE
KETONES UR STRIP-MCNC: 80 MG/DL
LEUKOCYTE ESTERASE UR QL STRIP: NEGATIVE
MCH RBC QN AUTO: 27.5 PG (ref 26.5–33)
MCHC RBC AUTO-ENTMCNC: 32.5 G/DL (ref 31.5–36.5)
MCV RBC AUTO: 85 FL (ref 78–100)
NITRATE UR QL: NEGATIVE
PH UR STRIP: 6 [PH] (ref 5–7)
PLATELET # BLD AUTO: 282 10E3/UL (ref 150–450)
RBC # BLD AUTO: 4.14 10E6/UL (ref 3.8–5.2)
SP GR UR STRIP: 1.02 (ref 1–1.03)
UROBILINOGEN UR STRIP-ACNC: 0.2 E.U./DL
WBC # BLD AUTO: 7.1 10E3/UL (ref 4–11)

## 2022-08-24 PROCEDURE — 81003 URINALYSIS AUTO W/O SCOPE: CPT | Performed by: FAMILY MEDICINE

## 2022-08-24 PROCEDURE — 36415 COLL VENOUS BLD VENIPUNCTURE: CPT | Performed by: FAMILY MEDICINE

## 2022-08-24 PROCEDURE — 86900 BLOOD TYPING SEROLOGIC ABO: CPT | Performed by: FAMILY MEDICINE

## 2022-08-24 PROCEDURE — 86780 TREPONEMA PALLIDUM: CPT | Performed by: FAMILY MEDICINE

## 2022-08-24 PROCEDURE — 87591 N.GONORRHOEAE DNA AMP PROB: CPT | Performed by: FAMILY MEDICINE

## 2022-08-24 PROCEDURE — 86901 BLOOD TYPING SEROLOGIC RH(D): CPT | Performed by: FAMILY MEDICINE

## 2022-08-24 PROCEDURE — 86762 RUBELLA ANTIBODY: CPT | Performed by: FAMILY MEDICINE

## 2022-08-24 PROCEDURE — 86850 RBC ANTIBODY SCREEN: CPT | Performed by: FAMILY MEDICINE

## 2022-08-24 PROCEDURE — 85027 COMPLETE CBC AUTOMATED: CPT | Performed by: FAMILY MEDICINE

## 2022-08-24 PROCEDURE — 99207 PR FIRST OB VISIT: CPT | Performed by: FAMILY MEDICINE

## 2022-08-24 PROCEDURE — 87491 CHLMYD TRACH DNA AMP PROBE: CPT | Performed by: FAMILY MEDICINE

## 2022-08-24 PROCEDURE — 87389 HIV-1 AG W/HIV-1&-2 AB AG IA: CPT | Performed by: FAMILY MEDICINE

## 2022-08-24 PROCEDURE — 83655 ASSAY OF LEAD: CPT | Mod: 90 | Performed by: FAMILY MEDICINE

## 2022-08-24 PROCEDURE — 86803 HEPATITIS C AB TEST: CPT | Performed by: FAMILY MEDICINE

## 2022-08-24 PROCEDURE — 99000 SPECIMEN HANDLING OFFICE-LAB: CPT | Performed by: FAMILY MEDICINE

## 2022-08-24 PROCEDURE — 87086 URINE CULTURE/COLONY COUNT: CPT | Performed by: FAMILY MEDICINE

## 2022-08-24 PROCEDURE — 87340 HEPATITIS B SURFACE AG IA: CPT | Performed by: FAMILY MEDICINE

## 2022-08-24 RX ORDER — ONDANSETRON 4 MG/1
4 TABLET, FILM COATED ORAL EVERY 8 HOURS PRN
Qty: 30 TABLET | Refills: 1 | Status: SHIPPED | OUTPATIENT
Start: 2022-08-24 | End: 2022-09-22

## 2022-08-24 NOTE — PROGRESS NOTES
New OB Clinic Note - 2022   Visit was completed along with Nabila .   SUBJECTIVE:  Harman Abbott is a 31 year old  female @ 12w0d who is here for new OB visit.   LMP end of May. Ultrasound at 6w5d gives GEE 3/8/23  Current Concerns:   She reports nausea is improved- still using zofran prn. Needs refill  She denies bleeding, cramping. No loss of fluid. She denies HA.   Denies dysuria or hematuria.   Denies constipation.  OB History:  Patient is . Prior Pregnancies:  OB History    Para Term  AB Living   3 3 3 0 0 3   SAB IAB Ectopic Multiple Live Births   0 0 0 0 3      # Outcome Date GA Lbr Wilfredo/2nd Weight Sex Delivery Anes PTL Lv   3 Term 18 40w0d 01:54 / 00:05 2.835 kg (6 lb 4 oz) F Vag-Spont None N LYDIA      Name: KEVIN,FEMALE-HARMAN      Apgar1: 9  Apgar5: 9   2 Term 02/16/15 38w3d 19:32 / 00:10 2.523 kg (5 lb 9 oz) F Vag-Spont None N LYDIA      Name: Nolvia Barger      Apgar1: 8  Apgar5: 9   1 Term 08 40w0d 07:00 2.9 kg (6 lb 6.3 oz) F Vag-Spont None  LYDIA      Birth Comments: had long prodromal episode      Name: Chance Hammer     She does not have a history of  birth before 37 weeks with a warren gestation.  She does not have a history of preeclampsia in prior pregnancy.   She does not have a history of recurrent (>2) pregnancy losses.  She does have a history of Down's syndrome, sickle cell anemia or other genetic disorder affecting a child in her family.  She does not have a history of major depression or postpartum depression.  She does not have a history of STI's including Chlamydia, gonorrhea, Hep B virus, syphilis, HPV, or genital herpes.   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  Current Medications: prenatal vitamins, zofran prn    Past Medical History:   Diagnosis Date     Postpartum hemorrhage 2018     No past surgical history on file.  No  "family history on file.  Current Outpatient Medications   Medication     ondansetron (ZOFRAN) 4 MG tablet     Prenatal Vit-Fe Fumarate-FA (PRENATAL MULTIVITAMIN W/IRON) 27-0.8 MG tablet     Current Facility-Administered Medications   Medication     lidocaine 1% with EPINEPHrine 1:100,000 injection 2.5 mL     ?  OBJECTIVE:  Vitals:    08/24/22 0908   BP: 92/56   Pulse: 90   Resp: 16   Temp: 98  F (36.7  C)   TempSrc: Oral   SpO2: 99%   Weight: 68.6 kg (151 lb 4 oz)   Height: 1.565 m (5' 1.61\")     Gen: Awake, alert, in no acute distress  Pelvic Exam: Vagina and vulva are normal; no discharge is noted. She is not due for pap smear today.  Lower extremities: No edema  Neuro: No focal deficits  's, bedside US showed cardiac activity, fetal movement  Results for orders placed or performed in visit on 08/24/22   CBC with platelets     Status: Abnormal   Result Value Ref Range    WBC Count 7.1 4.0 - 11.0 10e3/uL    RBC Count 4.14 3.80 - 5.20 10e6/uL    Hemoglobin 11.4 (L) 11.7 - 15.7 g/dL    Hematocrit 35.1 35.0 - 47.0 %    MCV 85 78 - 100 fL    MCH 27.5 26.5 - 33.0 pg    MCHC 32.5 31.5 - 36.5 g/dL    RDW 13.7 10.0 - 15.0 %    Platelet Count 282 150 - 450 10e3/uL   Urinalysis Macroscopic - lab collect     Status: Abnormal   Result Value Ref Range    Color Urine Yellow Colorless, Straw, Light Yellow, Yellow    Appearance Urine Clear Clear    Glucose Urine Negative Negative mg/dL    Bilirubin Urine Small (A) Negative    Ketones Urine 80  (A) Negative mg/dL    Specific Gravity Urine 1.020 1.005 - 1.030    Blood Urine Negative Negative    pH Urine 6.0 5.0 - 7.0    Protein Albumin Urine Negative Negative mg/dL    Urobilinogen Urine 0.2 0.2, 1.0 E.U./dL    Nitrite Urine Negative Negative    Leukocyte Esterase Urine Negative Negative   ABO/Rh type and screen     Status: None (In process)    Narrative    The following orders were created for panel order ABO/Rh type and screen.  Procedure                               " Abnormality         Status                     ---------                               -----------         ------                     Adult Type and Screen[[id number]]                            In process                   Please view results for these tests on the individual orders.     ASSESSMENT/PLAN:  Harman Abbott is a 31 year old  at 12w0d by 6 week US. Estimated Date of Delivery: Mar 8, 2023.   1. Continue prenatal vitamins.   2. Pelvic exam including GC, Chlamydia and PAP (if >21yrs) was completed.  3. Prenatal labs completed - prenatal profile, UA/UC, HIV   4. Reviewed eligibility for low-dose aspirin therapy for prevention of preeclampsia (preeclampsia in prior pregnancy, pre-existing HTN or DM, or multiple other risk factors including  delivery, chronic HTN, DM, obesity, low socioeconomic status, non- ethnicity). Patient is not a candidate for this.   5. Reviewed eligibility for 17-hydroxyprogesterone therapy for prevention of  birth (prior warren delivery before 37 weeks). Patient is not a candidate for this.   6. Offered genetic screening- declines.   7. Counseled on benefits of breastfeeding. Patient is planning to breastfeed.      Harman was seen today for prenatal care.    Diagnoses and all orders for this visit:    Pregnancy, unspecified gestational age  -     ABO/Rh type and screen; Future  -     Hepatitis B surface antigen; Future  -     CBC with platelets; Future  -     HIV Antigen Antibody Combo; Future  -     Rubella Antibody IgG; Future  -     Treponema Abs w Reflex to RPR and Titer; Future  -     Urine Culture Aerobic Bacterial; Future  -     Lead, Venous Blood; Future  -     Chlamydia trachomatis PCR; Future  -     Neisseria gonorrhoeae PCR; Future  -     Hepatitis C antibody; Future  -     Urinalysis Macroscopic - lab collect; Future  -     ABO/Rh type and screen  -     Hepatitis B surface antigen  -     CBC with platelets  -     HIV Antigen Antibody Combo  -      Rubella Antibody IgG  -     Treponema Abs w Reflex to RPR and Titer  -     Lead, Venous Blood  -     Hepatitis C antibody  -     Chlamydia trachomatis PCR  -     Neisseria gonorrhoeae PCR  -     Urine Culture Aerobic Bacterial  -     Urinalysis Macroscopic - lab collect    Nausea  -     ondansetron (ZOFRAN) 4 MG tablet; Take 1 tablet (4 mg) by mouth every 8 hours as needed for nausea    Calculus of gallbladder without cholecystitis without obstruction: Did not have surgery previously due to being pregnant. Has not had any symptoms since then. Continue to monitor.    ?  RTC in 4 weeks or sooner if problems.     Lucia Daily MD    Options for treatment and follow-up care were reviewed with the patient and/or guardian. Harman Mercado Moh and/or guardian engaged in the decision making process and verbalized understanding of the options discussed and agreed with the final plan.

## 2022-08-25 LAB
C TRACH DNA SPEC QL NAA+PROBE: NEGATIVE
HBV SURFACE AG SERPL QL IA: NONREACTIVE
HCV AB SERPL QL IA: NONREACTIVE
HIV 1+2 AB+HIV1 P24 AG SERPL QL IA: NONREACTIVE
N GONORRHOEA DNA SPEC QL NAA+PROBE: NEGATIVE
RUBV IGG SERPL QL IA: 4.75 INDEX
RUBV IGG SERPL QL IA: POSITIVE
T PALLIDUM AB SER QL: NONREACTIVE

## 2022-08-26 LAB
BACTERIA UR CULT: NO GROWTH
LEAD BLDV-MCNC: <2 UG/DL

## 2022-09-22 ENCOUNTER — PRENATAL OFFICE VISIT (OUTPATIENT)
Dept: FAMILY MEDICINE | Facility: CLINIC | Age: 32
End: 2022-09-22
Payer: COMMERCIAL

## 2022-09-22 VITALS
DIASTOLIC BLOOD PRESSURE: 60 MMHG | BODY MASS INDEX: 29.21 KG/M2 | HEART RATE: 108 BPM | TEMPERATURE: 98.5 F | OXYGEN SATURATION: 99 % | HEIGHT: 62 IN | RESPIRATION RATE: 20 BRPM | SYSTOLIC BLOOD PRESSURE: 100 MMHG | WEIGHT: 158.75 LBS

## 2022-09-22 DIAGNOSIS — Z76.0 ENCOUNTER FOR MEDICATION REFILL: ICD-10-CM

## 2022-09-22 DIAGNOSIS — Z34.90 PREGNANCY, UNSPECIFIED GESTATIONAL AGE: Primary | ICD-10-CM

## 2022-09-22 PROCEDURE — 90686 IIV4 VACC NO PRSV 0.5 ML IM: CPT | Performed by: FAMILY MEDICINE

## 2022-09-22 PROCEDURE — 90471 IMMUNIZATION ADMIN: CPT | Performed by: FAMILY MEDICINE

## 2022-09-22 PROCEDURE — 99207 PR PRENATAL VISIT: CPT | Performed by: FAMILY MEDICINE

## 2022-09-22 RX ORDER — ACETAMINOPHEN 500 MG
500-1000 TABLET ORAL EVERY 8 HOURS PRN
Qty: 100 TABLET | Refills: 0 | Status: SHIPPED | OUTPATIENT
Start: 2022-09-22 | End: 2022-12-08

## 2022-09-22 NOTE — PROGRESS NOTES
"SUBJECTIVE:   at 16w1d. Estimated Date of Delivery: Mar 8, 2023.  She is doing well. She denies nausea and vomiting. She denies abdominal pain/cramping, vaginal bleeding, leakage of fluid. Fetal movement is not yet present.   Concerns: planning gender reveal and baby shower once she has ultrasound  ROS  Negative except as noted above in HPI.  OBJECTIVE:  Blood pressure 100/60, pulse 108, temperature 98.5  F (36.9  C), temperature source Oral, resp. rate 20, height 1.565 m (5' 1.61\"), weight 72 kg (158 lb 12 oz), last menstrual period 2022, SpO2 99 %, not currently breastfeeding.  Gen: comfortable, no acute distress.  See OB Vitals flowsheet.  ASSESSMENT/PLAN:  Harman was seen today for prenatal care.    Diagnoses and all orders for this visit:    Pregnancy, unspecified gestational age  -     US OB > 14 Weeks; Future    Encounter for medication refill  -     acetaminophen (TYLENOL) 500 MG tablet; Take 1-2 tablets (500-1,000 mg) by mouth every 8 hours as needed for mild pain    Other orders  -     INFLUENZA VACCINE IM >6 MO VALENT IIV4 (AFLURIA/FLUZONE)      Declines Covid-19 vaccine booster    -IUP at 16w1d:     Prenatal labs reviewed    RTC in 4 weeks or sooner with problems.    Visit completed along with assistance of Nabila .    Lucia Daily MD    "

## 2022-10-20 ENCOUNTER — HOSPITAL ENCOUNTER (OUTPATIENT)
Dept: ULTRASOUND IMAGING | Facility: HOSPITAL | Age: 32
Discharge: HOME | End: 2022-10-20
Attending: FAMILY MEDICINE | Admitting: FAMILY MEDICINE
Payer: COMMERCIAL

## 2022-10-20 DIAGNOSIS — Z34.90 PREGNANCY, UNSPECIFIED GESTATIONAL AGE: ICD-10-CM

## 2022-10-20 PROCEDURE — 76805 OB US >/= 14 WKS SNGL FETUS: CPT

## 2022-10-21 ENCOUNTER — TELEPHONE (OUTPATIENT)
Dept: FAMILY MEDICINE | Facility: CLINIC | Age: 32
End: 2022-10-21

## 2022-10-21 NOTE — TELEPHONE ENCOUNTER
Called patient with the assistance of an  to relay provider message regarding ultrasound result and her missing appointment. Patient reported is doing ok with no changes in symptoms.  Patient has no further questions.  Advised to call clinic with changes in symptoms (bleeding, water leak, and or contraction).  Patient understood recommendation.    BRIELLE LindN, RN  [facility]

## 2022-11-10 ENCOUNTER — PRENATAL OFFICE VISIT (OUTPATIENT)
Dept: FAMILY MEDICINE | Facility: CLINIC | Age: 32
End: 2022-11-10
Payer: COMMERCIAL

## 2022-11-10 VITALS
SYSTOLIC BLOOD PRESSURE: 96 MMHG | HEART RATE: 83 BPM | WEIGHT: 158 LBS | DIASTOLIC BLOOD PRESSURE: 58 MMHG | OXYGEN SATURATION: 98 % | HEIGHT: 62 IN | BODY MASS INDEX: 29.08 KG/M2 | TEMPERATURE: 98.4 F

## 2022-11-10 DIAGNOSIS — Z34.90 PREGNANCY, UNSPECIFIED GESTATIONAL AGE: Primary | ICD-10-CM

## 2022-11-10 PROCEDURE — 99207 PR PRENATAL VISIT: CPT | Performed by: FAMILY MEDICINE

## 2022-11-10 NOTE — PROGRESS NOTES
"SUBJECTIVE:   at 23w1d. Estimated Date of Delivery: Mar 8, 2023.  She is doing well. She denies nausea and vomiting. She denies abdominal pain/cramping, vaginal bleeding, leakage of fluid. Fetal movement is present.   Concerns: No concerns. Her cousin is throwing a gender reveal baby shower- she does not know the gender yet.  ROS  Negative except as noted above in HPI.  OBJECTIVE:  Blood pressure 96/58, pulse 83, temperature 98.4  F (36.9  C), temperature source Oral, height 1.565 m (5' 1.61\"), weight 71.7 kg (158 lb), last menstrual period 2022, SpO2 98 %, not currently breastfeeding.  Gen: comfortable, no acute distress.  See OB Vitals flowsheet.  ASSESSMENT/PLAN:  Harman was seen today for prenatal care.    Diagnoses and all orders for this visit:    Pregnancy, unspecified gestational age      -IUP at 23w1d:     Prenatal labs reviewed     RTC in 4 weeks or sooner with problems. 1 hour GTT next visit    Visit completed along with assistance of Nabila .    Lucia Daily MD    "

## 2022-11-28 ENCOUNTER — OFFICE VISIT (OUTPATIENT)
Dept: FAMILY MEDICINE | Facility: CLINIC | Age: 32
End: 2022-11-28
Payer: COMMERCIAL

## 2022-11-28 VITALS
BODY MASS INDEX: 29.21 KG/M2 | TEMPERATURE: 97.9 F | SYSTOLIC BLOOD PRESSURE: 100 MMHG | HEART RATE: 93 BPM | WEIGHT: 157.7 LBS | RESPIRATION RATE: 26 BRPM | DIASTOLIC BLOOD PRESSURE: 66 MMHG | OXYGEN SATURATION: 97 %

## 2022-11-28 DIAGNOSIS — Z3A.25 25 WEEKS GESTATION OF PREGNANCY: ICD-10-CM

## 2022-11-28 DIAGNOSIS — Z20.828 EXPOSURE TO INFLUENZA: ICD-10-CM

## 2022-11-28 DIAGNOSIS — R09.81 NASAL CONGESTION: Primary | ICD-10-CM

## 2022-11-28 PROCEDURE — 99213 OFFICE O/P EST LOW 20 MIN: CPT | Performed by: NURSE PRACTITIONER

## 2022-11-28 RX ORDER — GUAIFENESIN 600 MG/1
1200 TABLET, EXTENDED RELEASE ORAL 2 TIMES DAILY PRN
Qty: 24 TABLET | Refills: 0 | Status: SHIPPED | OUTPATIENT
Start: 2022-11-28 | End: 2022-12-08

## 2022-11-28 NOTE — PROGRESS NOTES
Assessment & Plan     Nasal congestion    - guaiFENesin (MUCINEX) 600 MG 12 hr tablet  Dispense: 24 tablet; Refill: 0    25 weeks gestation of pregnancy    - guaiFENesin (MUCINEX) 600 MG 12 hr tablet  Dispense: 24 tablet; Refill: 0    Exposure to influenza         Focused exam and history done due to COVID-19 pandemic in a walk-in setting.      History, exam, and vital signs consistent with a viral URI.  Had a flu shot.  Daughter is positive for influenza A.  Has been sick for 2 weeks with 2-day break, most likely associated with catching milder form of influenza A from daughter.  Daughter has been sick for over 9 days, so no need for prophylaxis.    No red flags on exam.    Recheck if shortness of breath or new fevers develop.  Rest.     OTCs recommended: None [   ].  Dextromethorphan  [  ], guaifenesin [ x ], pseudoephedrine [   ], Afrin for no greater than 3 days [  ], Tylenol or ibuprofen [  ].                Return in about 1 week (around 12/5/2022) for If no better.    Dunia Vargas, [facility] [place]    Shaan Hammer is a 32 year old female who presents to clinic today for the following health issues:  Chief Complaint   Patient presents with     Cough     X over 1 week. Congestion. Pt states unable to breath well due to nasal congestion. Mild headache; worsen when cough a lot.      HPI    Cough and congestion for 2 weeks.  Got better x 2 days.      25 weeks pregnant.    Daughter here with URI sx and cough for 1 week with fever.      Headache with cough.      No fevers.      Got a flu shot this year.      Taking Tylenol as needed.        Review of Systems  See HPI.      Objective    /66 (BP Location: Right arm, Patient Position: Sitting, Cuff Size: Adult Regular)   Pulse 93   Temp 97.9  F (36.6  C) (Oral)   Resp 26   Wt 71.5 kg (157 lb 11.2 oz)   LMP 05/26/2022 (Approximate)   SpO2 97%   BMI 29.21 kg/m    Physical Exam  Constitutional:       General: She is not in  acute distress.     Appearance: She is well-developed and well-nourished.   HENT:      Right Ear: Tympanic membrane normal.      Left Ear: Tympanic membrane normal.      Nose: Congestion present.   Eyes:      General:         Right eye: No discharge.         Left eye: No discharge.      Conjunctiva/sclera: Conjunctivae normal.   Cardiovascular:      Rate and Rhythm: Normal rate.      Pulses: Intact distal pulses.   Pulmonary:      Effort: Pulmonary effort is normal.      Breath sounds: Normal breath sounds. No wheezing.   Musculoskeletal:         General: Normal range of motion.   Skin:     General: Skin is warm and dry.      Capillary Refill: Capillary refill takes less than 2 seconds.   Neurological:      Mental Status: She is alert and oriented to person, place, and time.   Psychiatric:         Mood and Affect: Mood and affect and mood normal.         Behavior: Behavior normal.         Thought Content: Thought content normal.         Judgment: Judgment normal.

## 2022-11-28 NOTE — PATIENT INSTRUCTIONS
Take guaifenesin as needed for congestion and cough.    This is safe in pregnancy.    Recheck if ongoing for 1 more week.

## 2022-12-08 ENCOUNTER — PRENATAL OFFICE VISIT (OUTPATIENT)
Dept: FAMILY MEDICINE | Facility: CLINIC | Age: 32
End: 2022-12-08
Payer: COMMERCIAL

## 2022-12-08 VITALS
RESPIRATION RATE: 20 BRPM | TEMPERATURE: 99.1 F | WEIGHT: 159.3 LBS | HEART RATE: 103 BPM | BODY MASS INDEX: 29.32 KG/M2 | OXYGEN SATURATION: 99 % | HEIGHT: 62 IN | DIASTOLIC BLOOD PRESSURE: 60 MMHG | SYSTOLIC BLOOD PRESSURE: 100 MMHG

## 2022-12-08 DIAGNOSIS — Z3A.27 27 WEEKS GESTATION OF PREGNANCY: Primary | ICD-10-CM

## 2022-12-08 DIAGNOSIS — O99.891 LEG CRAMPS IN PREGNANCY: ICD-10-CM

## 2022-12-08 DIAGNOSIS — L29.9 ITCHING: ICD-10-CM

## 2022-12-08 DIAGNOSIS — R25.2 LEG CRAMPS IN PREGNANCY: ICD-10-CM

## 2022-12-08 LAB
GLUCOSE 1H P 50 G GLC PO SERPL-MCNC: 165 MG/DL (ref 70–129)
HGB BLD-MCNC: 9.8 G/DL (ref 11.7–15.7)

## 2022-12-08 PROCEDURE — 86780 TREPONEMA PALLIDUM: CPT | Performed by: FAMILY MEDICINE

## 2022-12-08 PROCEDURE — 36415 COLL VENOUS BLD VENIPUNCTURE: CPT | Performed by: FAMILY MEDICINE

## 2022-12-08 PROCEDURE — 80076 HEPATIC FUNCTION PANEL: CPT | Performed by: FAMILY MEDICINE

## 2022-12-08 PROCEDURE — 99000 SPECIMEN HANDLING OFFICE-LAB: CPT | Performed by: FAMILY MEDICINE

## 2022-12-08 PROCEDURE — 99207 PR PRENATAL VISIT: CPT | Performed by: FAMILY MEDICINE

## 2022-12-08 PROCEDURE — 82950 GLUCOSE TEST: CPT | Performed by: FAMILY MEDICINE

## 2022-12-08 PROCEDURE — 85018 HEMOGLOBIN: CPT | Performed by: FAMILY MEDICINE

## 2022-12-08 PROCEDURE — 82239 BILE ACIDS TOTAL: CPT | Mod: 90 | Performed by: FAMILY MEDICINE

## 2022-12-08 RX ORDER — FERROUS SULFATE 325(65) MG
325 TABLET ORAL
Qty: 90 TABLET | Refills: 3 | Status: SHIPPED | OUTPATIENT
Start: 2022-12-08 | End: 2023-04-26

## 2022-12-08 RX ORDER — CALCIUM CARBONATE 500 MG/1
1 TABLET, CHEWABLE ORAL DAILY PRN
Qty: 100 TABLET | Refills: 1 | Status: SHIPPED | OUTPATIENT
Start: 2022-12-08 | End: 2023-04-26

## 2022-12-08 NOTE — PROGRESS NOTES
"SUBJECTIVE:   at 27w1d. Estimated Date of Delivery: Mar 8, 2023.  She is doing well. She denies vaginal bleeding, leakage of fluid, or urinary symptoms. Fetal movement is present. She is not having contractions.  Concerns: itching when she goes outside. Face, fingers. No rash. She has this when she is not pregnant. No jaundice. Denies RUQ pain.  ROS  Negative except as noted above in HPI  OBJECTIVE:  Blood pressure 100/60, pulse 103, temperature 99.1  F (37.3  C), temperature source Oral, resp. rate 20, height 1.565 m (5' 1.61\"), weight 72.3 kg (159 lb 4.8 oz), last menstrual period 2022, SpO2 99 %, not currently breastfeeding.  Gen: Comfortable, no acute distress.  Eyes: sclera clear  Abd: Gravid, non-tender, no RUQ tenderness  Skin: dry skin  See OB Vitals flowsheet.  ASSESSMENT/PLAN:  Harman was seen today for prenatal care.    Diagnoses and all orders for this visit:    27 weeks gestation of pregnancy  -     Glucose tolerance, gest screen, 1 hour; Future  -     Treponema Abs w Reflex to RPR and Titer; Future  -     Hemoglobin; Future  -     Hepatic panel (Albumin, ALT, AST, Bili, Alk Phos, TP); Future  -     Bile acids total; Future  -     Glucose tolerance, gest screen, 1 hour  -     Treponema Abs w Reflex to RPR and Titer  -     Hemoglobin  -     Hepatic panel (Albumin, ALT, AST, Bili, Alk Phos, TP)  -     Bile acids total  -     ferrous sulfate (FEROSUL) 325 (65 Fe) MG tablet; Take 1 tablet (325 mg) by mouth daily (with breakfast)  -     Glucose tolerance, gest std 100 gm, 3 hr; Future    Itching: Need to rule out obstetric etiology/ICP- unlikely considering symptoms preceded pregnancy and are more consistent with dry skin dermatitis. Check labs.  -     Hepatic panel (Albumin, ALT, AST, Bili, Alk Phos, TP); Future  -     Bile acids total; Future  -     Hepatic panel (Albumin, ALT, AST, Bili, Alk Phos, TP)  -     Bile acids total    Leg cramps in pregnancy: Reviewed increasing water intake, try " potassium-rich foods.  -     calcium carbonate (TUMS) 500 MG chewable tablet; Take 1 tablet (500 mg) by mouth daily as needed for heartburn      -IUP at 27w1d:     Prenatal labs reviewed     RTC in 2 weeks or sooner with problems. Bedside US next visit, discuss postpartum contraception    Visit completed along with assistance of Nabila .    Lucia Daily MD

## 2022-12-09 ENCOUNTER — TELEPHONE (OUTPATIENT)
Dept: FAMILY MEDICINE | Facility: CLINIC | Age: 32
End: 2022-12-09

## 2022-12-09 LAB
ALBUMIN SERPL BCG-MCNC: 3.3 G/DL (ref 3.5–5.2)
ALP SERPL-CCNC: 89 U/L (ref 35–104)
ALT SERPL W P-5'-P-CCNC: 16 U/L (ref 10–35)
AST SERPL W P-5'-P-CCNC: 23 U/L (ref 10–35)
BILE AC SERPL-SCNC: 5 UMOL/L
BILIRUB DIRECT SERPL-MCNC: <0.2 MG/DL (ref 0–0.3)
BILIRUB SERPL-MCNC: 0.2 MG/DL
PROT SERPL-MCNC: 6.3 G/DL (ref 6.4–8.3)
T PALLIDUM AB SER QL: NONREACTIVE

## 2022-12-09 NOTE — TELEPHONE ENCOUNTER
Called patient with assistance of an  to relay provider message regarding test result from provider.  Pharmacy to where medication (Iron) being sent to provided to patient as well.  Future lab only appointment and fasting information for the 3 hours glucose test provided to patient.  Date, time and location confirmed with patient.    BRIELLE LindN, RN  [facility]

## 2022-12-12 ENCOUNTER — LAB (OUTPATIENT)
Dept: LAB | Facility: CLINIC | Age: 32
End: 2022-12-12
Payer: COMMERCIAL

## 2022-12-12 DIAGNOSIS — Z3A.27 27 WEEKS GESTATION OF PREGNANCY: ICD-10-CM

## 2022-12-12 LAB
GESTATIONAL GTT 1 HR POST DOSE: 157 MG/DL (ref 60–179)
GESTATIONAL GTT 2 HR POST DOSE: 119 MG/DL (ref 60–154)
GESTATIONAL GTT 3 HR POST DOSE: 117 MG/DL (ref 60–139)
GLUCOSE P FAST SERPL-MCNC: 75 MG/DL (ref 60–94)

## 2022-12-12 PROCEDURE — 82951 GLUCOSE TOLERANCE TEST (GTT): CPT

## 2022-12-12 PROCEDURE — 36415 COLL VENOUS BLD VENIPUNCTURE: CPT

## 2022-12-12 PROCEDURE — 82952 GTT-ADDED SAMPLES: CPT

## 2022-12-13 ENCOUNTER — TELEPHONE (OUTPATIENT)
Dept: FAMILY MEDICINE | Facility: CLINIC | Age: 32
End: 2022-12-13

## 2022-12-13 NOTE — TELEPHONE ENCOUNTER
Pt called back with assistance from  and writer relayed lab result. Pt verbalized understanding.    BRIELLE Nieto CemN RN  [facility]

## 2022-12-13 NOTE — TELEPHONE ENCOUNTER
Called with assistance of an  to relay provider test result.  However, no answer.  Message left on vm request a call back for test result message.  Clinic number provided.    LAUREN Lind, RN  [facility]

## 2022-12-22 ENCOUNTER — PRENATAL OFFICE VISIT (OUTPATIENT)
Dept: FAMILY MEDICINE | Facility: CLINIC | Age: 32
End: 2022-12-22
Payer: COMMERCIAL

## 2022-12-22 VITALS
HEART RATE: 108 BPM | WEIGHT: 157 LBS | OXYGEN SATURATION: 99 % | SYSTOLIC BLOOD PRESSURE: 98 MMHG | HEIGHT: 62 IN | TEMPERATURE: 98 F | BODY MASS INDEX: 28.89 KG/M2 | DIASTOLIC BLOOD PRESSURE: 56 MMHG

## 2022-12-22 DIAGNOSIS — Z34.90 PREGNANCY, UNSPECIFIED GESTATIONAL AGE: Primary | ICD-10-CM

## 2022-12-22 DIAGNOSIS — D64.9 ANEMIA, UNSPECIFIED TYPE: ICD-10-CM

## 2022-12-22 PROCEDURE — 99207 PR PRENATAL VISIT: CPT | Performed by: FAMILY MEDICINE

## 2022-12-22 NOTE — PROGRESS NOTES
"SUBJECTIVE:   at 29w1d by 6 week US. Estimated Date of Delivery: Mar 8, 2023.  She is doing well. She denies vaginal bleeding, leakage of fluid, or urinary symptoms. Fetal movement is present. She is not having contractions.  Concerns: tums helped with leg cramps  ROS  Negative except as noted above in HPI  OBJECTIVE:  Blood pressure 98/56, pulse 108, temperature 98  F (36.7  C), temperature source Oral, height 1.568 m (5' 1.75\"), weight 71.2 kg (157 lb), last menstrual period 2022, SpO2 99 %, not currently breastfeeding.  Gen: Comfortable, no acute distress.  Abd: Gravid, non-tender  See OB Vitals flowsheet.  ASSESSMENT/PLAN:  Harman was seen today for prenatal care.    Diagnoses and all orders for this visit:    Pregnancy, unspecified gestational age    Anemia, unspecified type: On oral iron- recheck at next visit      -IUP at 29w1d:     Prenatal labs reviewed     PP contraception: nexplanon    Breast/Bottle: breast and bottle,  for 2 years her other children. She is interested in having breast pump.    RTC in 2 weeks or sooner with problems. Check hemoglobin next visit, Tdap    Visit completed along with assistance of Nabila .    Lucia Daily MD    "

## 2023-01-19 ENCOUNTER — PRENATAL OFFICE VISIT (OUTPATIENT)
Dept: FAMILY MEDICINE | Facility: CLINIC | Age: 33
End: 2023-01-19
Payer: COMMERCIAL

## 2023-01-19 VITALS
OXYGEN SATURATION: 99 % | SYSTOLIC BLOOD PRESSURE: 98 MMHG | HEIGHT: 62 IN | WEIGHT: 161 LBS | HEART RATE: 84 BPM | BODY MASS INDEX: 29.63 KG/M2 | DIASTOLIC BLOOD PRESSURE: 56 MMHG | TEMPERATURE: 98.6 F

## 2023-01-19 DIAGNOSIS — Z34.90 PREGNANCY, UNSPECIFIED GESTATIONAL AGE: Primary | ICD-10-CM

## 2023-01-19 DIAGNOSIS — D64.9 ANEMIA, UNSPECIFIED TYPE: ICD-10-CM

## 2023-01-19 LAB — HGB BLD-MCNC: 11.2 G/DL (ref 11.7–15.7)

## 2023-01-19 PROCEDURE — 90471 IMMUNIZATION ADMIN: CPT | Performed by: FAMILY MEDICINE

## 2023-01-19 PROCEDURE — 36415 COLL VENOUS BLD VENIPUNCTURE: CPT | Performed by: FAMILY MEDICINE

## 2023-01-19 PROCEDURE — 85018 HEMOGLOBIN: CPT | Performed by: FAMILY MEDICINE

## 2023-01-19 PROCEDURE — 90715 TDAP VACCINE 7 YRS/> IM: CPT | Performed by: FAMILY MEDICINE

## 2023-01-19 PROCEDURE — 99207 PR PRENATAL VISIT: CPT | Performed by: FAMILY MEDICINE

## 2023-01-19 NOTE — PROGRESS NOTES
"SUBJECTIVE:   at 33w1d. Estimated Date of Delivery: Mar 8, 2023.  She is doing well. She denies vaginal bleeding, leakage of fluid, or urinary symptoms. Fetal movement is present. She is not having contractions.  Concerns: no concerns  ROS  Negative except as noted above in HPI  OBJECTIVE:  Blood pressure 98/56, pulse 84, temperature 98.6  F (37  C), temperature source Oral, height 1.568 m (5' 1.75\"), weight 73 kg (161 lb), last menstrual period 2022, SpO2 99 %, not currently breastfeeding.  Gen: Comfortable, no acute distress.  Abd: Gravid, non-tender  See OB Vitals flowsheet.  ASSESSMENT/PLAN:  Harman was seen today for prenatal care.    Diagnoses and all orders for this visit:    Pregnancy, unspecified gestational age    Anemia, unspecified type: On oral iron- recheck.  -     Hemoglobin; Future  -     Hemoglobin    Other orders  -     TDAP VACCINE (Adacel, Boostrix)  [[id number]]      -IUP at 33w1d:     Prenatal labs reviewed     RTC in 2 weeks or sooner with problems.    Visit completed along with assistance of Nabila .    Lucia Daily MD    "

## 2023-02-02 ENCOUNTER — PRENATAL OFFICE VISIT (OUTPATIENT)
Dept: FAMILY MEDICINE | Facility: CLINIC | Age: 33
End: 2023-02-02
Payer: COMMERCIAL

## 2023-02-02 VITALS
BODY MASS INDEX: 29.72 KG/M2 | RESPIRATION RATE: 24 BRPM | HEIGHT: 62 IN | WEIGHT: 161.5 LBS | HEART RATE: 89 BPM | OXYGEN SATURATION: 99 % | SYSTOLIC BLOOD PRESSURE: 98 MMHG | DIASTOLIC BLOOD PRESSURE: 64 MMHG | TEMPERATURE: 98.2 F

## 2023-02-02 DIAGNOSIS — Z34.90 PREGNANCY, UNSPECIFIED GESTATIONAL AGE: Primary | ICD-10-CM

## 2023-02-02 PROCEDURE — 99207 PR PRENATAL VISIT: CPT | Performed by: FAMILY MEDICINE

## 2023-02-02 NOTE — PROGRESS NOTES
"SUBJECTIVE:   at 35w1d. Estimated Date of Delivery: Mar 8, 2023.  She is doing well. She denies vaginal bleeding, leakage of fluid, or urinary symptoms. Fetal movement is present. She is not having contractions.  Concerns: none  ROS  Negative except as noted above in HPI  OBJECTIVE:  Blood pressure 98/64, pulse 89, temperature 98.2  F (36.8  C), temperature source Oral, resp. rate 24, height 1.568 m (5' 1.75\"), weight 73.3 kg (161 lb 8 oz), last menstrual period 2022, SpO2 99 %, not currently breastfeeding.  Gen: Comfortable, no acute distress.  Abd: Gravid, non-tender  See OB Vitals flowsheet.  ASSESSMENT/PLAN:  Harman was seen today for prenatal care.    Diagnoses and all orders for this visit:    Pregnancy, unspecified gestational age      -IUP at 35w1d:     Prenatal labs reviewed     Reviewed improved anemia- continue iron supplement until delivery    RTC in 1 weeks or sooner with problems. GBS next visit    Visit completed along with assistance of Nabila .    Lucia Daily MD    "

## 2023-02-09 ENCOUNTER — PRENATAL OFFICE VISIT (OUTPATIENT)
Dept: FAMILY MEDICINE | Facility: CLINIC | Age: 33
End: 2023-02-09
Payer: COMMERCIAL

## 2023-02-09 VITALS
DIASTOLIC BLOOD PRESSURE: 58 MMHG | OXYGEN SATURATION: 98 % | HEART RATE: 106 BPM | RESPIRATION RATE: 16 BRPM | SYSTOLIC BLOOD PRESSURE: 98 MMHG | TEMPERATURE: 98.4 F | WEIGHT: 162 LBS | BODY MASS INDEX: 29.81 KG/M2 | HEIGHT: 62 IN

## 2023-02-09 DIAGNOSIS — Z34.90 PREGNANCY, UNSPECIFIED GESTATIONAL AGE: Primary | ICD-10-CM

## 2023-02-09 DIAGNOSIS — J35.8 TONSIL STONE: ICD-10-CM

## 2023-02-09 PROCEDURE — 87653 STREP B DNA AMP PROBE: CPT | Performed by: FAMILY MEDICINE

## 2023-02-09 PROCEDURE — 99207 PR PRENATAL VISIT: CPT | Performed by: FAMILY MEDICINE

## 2023-02-09 NOTE — PROGRESS NOTES
"SUBJECTIVE:   at 36w1d. Estimated Date of Delivery: Mar 8, 2023.  She is doing well. She denies vaginal bleeding, leakage of fluid, or urinary symptoms. Fetal movement is present. She is not having contractions.  Concerns: she has noticed white spots on back of her throat. No pain. She removed and then she noticed another one.  ROS  Negative except as noted above in HPI  OBJECTIVE:  Blood pressure 98/58, pulse 106, temperature 98.4  F (36.9  C), temperature source Oral, resp. rate 16, height 1.568 m (5' 1.75\"), weight 73.5 kg (162 lb), last menstrual period 2022, SpO2 98 %, not currently breastfeeding.  Gen: Comfortable, no acute distress.  Abd: Gravid, non-tender  See OB Vitals flowsheet.  ASSESSMENT/PLAN:  Harman was seen today for prenatal care.    Diagnoses and all orders for this visit:    Pregnancy, unspecified gestational age  -     Group B strep PCR    Tonsil stone: offered reassurance- reviewed supportive cares.       -IUP at 36w1d:     Prenatal labs reviewed     RTC in 1 weeks or sooner with problems.    Visit completed along with assistance of Nabila .    Lucia Daily MD    "

## 2023-02-10 LAB — GP B STREP DNA SPEC QL NAA+PROBE: NEGATIVE

## 2023-02-16 ENCOUNTER — PRENATAL OFFICE VISIT (OUTPATIENT)
Dept: FAMILY MEDICINE | Facility: CLINIC | Age: 33
End: 2023-02-16
Payer: COMMERCIAL

## 2023-02-16 VITALS
HEIGHT: 62 IN | BODY MASS INDEX: 30.51 KG/M2 | SYSTOLIC BLOOD PRESSURE: 106 MMHG | RESPIRATION RATE: 20 BRPM | TEMPERATURE: 98.2 F | WEIGHT: 165.8 LBS | OXYGEN SATURATION: 98 % | DIASTOLIC BLOOD PRESSURE: 66 MMHG | HEART RATE: 111 BPM

## 2023-02-16 DIAGNOSIS — Z34.90 PREGNANCY, UNSPECIFIED GESTATIONAL AGE: Primary | ICD-10-CM

## 2023-02-16 PROCEDURE — 99207 PR PRENATAL VISIT: CPT | Performed by: FAMILY MEDICINE

## 2023-02-16 NOTE — PROGRESS NOTES
"SUBJECTIVE:   at 37w1d. Estimated Date of Delivery: Mar 8, 2023.  She is doing well. She denies vaginal bleeding, leakage of fluid, or urinary symptoms. Fetal movement is present. She is not having contractions.  Concerns: pain in right flank after waking up this morning  ROS  Negative except as noted above in HPI  OBJECTIVE:  Blood pressure 106/66, pulse 111, temperature 98.2  F (36.8  C), temperature source Oral, resp. rate 20, height 1.568 m (5' 1.73\"), weight 75.2 kg (165 lb 12.8 oz), last menstrual period 2022, SpO2 98 %, not currently breastfeeding.  Gen: Comfortable, no acute distress.  Abd: Gravid, non-tender  See OB Vitals flowsheet  ASSESSMENT/PLAN:  Harman was seen today for prenatal care.    Diagnoses and all orders for this visit:    Pregnancy, unspecified gestational age      -IUP at 37w1d:     Prenatal labs reviewed     Reviewed skin to skin.  Reviewed baby meds in hospital and 24 hour testing, delayed cord clamping.      RTC in 1 weeks or sooner with problems.    Visit completed along with assistance of Nabila .    Lucia Daily MD    "

## 2023-03-01 ENCOUNTER — HOSPITAL ENCOUNTER (INPATIENT)
Facility: HOSPITAL | Age: 33
LOS: 1 days | Discharge: HOME HEALTH | End: 2023-03-03
Attending: FAMILY MEDICINE | Admitting: FAMILY MEDICINE
Payer: COMMERCIAL

## 2023-03-02 PROBLEM — Z34.90 PREGNANCY: Status: ACTIVE | Noted: 2023-03-02

## 2023-03-02 LAB
ABO/RH(D): NORMAL
ANTIBODY SCREEN: NEGATIVE
ERYTHROCYTE [DISTWIDTH] IN BLOOD BY AUTOMATED COUNT: 13.9 % (ref 10–15)
HCT VFR BLD AUTO: 38.7 % (ref 35–47)
HGB BLD-MCNC: 12.4 G/DL (ref 11.7–15.7)
HOLD SPECIMEN: NORMAL
HOLD SPECIMEN: NORMAL
MCH RBC QN AUTO: 28.1 PG (ref 26.5–33)
MCHC RBC AUTO-ENTMCNC: 32 G/DL (ref 31.5–36.5)
MCV RBC AUTO: 88 FL (ref 78–100)
PLATELET # BLD AUTO: 251 10E3/UL (ref 150–450)
RBC # BLD AUTO: 4.41 10E6/UL (ref 3.8–5.2)
SPECIMEN EXPIRATION DATE: NORMAL
WBC # BLD AUTO: 9.8 10E3/UL (ref 4–11)

## 2023-03-02 PROCEDURE — 86850 RBC ANTIBODY SCREEN: CPT | Performed by: FAMILY MEDICINE

## 2023-03-02 PROCEDURE — 722N000001 HC LABOR CARE VAGINAL DELIVERY SINGLE

## 2023-03-02 PROCEDURE — 120N000001 HC R&B MED SURG/OB

## 2023-03-02 PROCEDURE — 250N000011 HC RX IP 250 OP 636: Performed by: FAMILY MEDICINE

## 2023-03-02 PROCEDURE — 36415 COLL VENOUS BLD VENIPUNCTURE: CPT | Performed by: FAMILY MEDICINE

## 2023-03-02 PROCEDURE — 250N000013 HC RX MED GY IP 250 OP 250 PS 637: Performed by: FAMILY MEDICINE

## 2023-03-02 PROCEDURE — 85027 COMPLETE CBC AUTOMATED: CPT | Performed by: FAMILY MEDICINE

## 2023-03-02 PROCEDURE — 10907ZC DRAINAGE OF AMNIOTIC FLUID, THERAPEUTIC FROM PRODUCTS OF CONCEPTION, VIA NATURAL OR ARTIFICIAL OPENING: ICD-10-PCS | Performed by: FAMILY MEDICINE

## 2023-03-02 PROCEDURE — 59400 OBSTETRICAL CARE: CPT | Performed by: FAMILY MEDICINE

## 2023-03-02 PROCEDURE — 250N000009 HC RX 250: Performed by: FAMILY MEDICINE

## 2023-03-02 RX ORDER — OXYTOCIN/0.9 % SODIUM CHLORIDE 30/500 ML
100-340 PLASTIC BAG, INJECTION (ML) INTRAVENOUS CONTINUOUS PRN
Status: DISCONTINUED | OUTPATIENT
Start: 2023-03-02 | End: 2023-03-03 | Stop reason: HOSPADM

## 2023-03-02 RX ORDER — IBUPROFEN 800 MG/1
800 TABLET, FILM COATED ORAL
Status: DISCONTINUED | OUTPATIENT
Start: 2023-03-02 | End: 2023-03-03 | Stop reason: HOSPADM

## 2023-03-02 RX ORDER — FENTANYL CITRATE 50 UG/ML
100 INJECTION, SOLUTION INTRAMUSCULAR; INTRAVENOUS
Status: DISCONTINUED | OUTPATIENT
Start: 2023-03-02 | End: 2023-03-02 | Stop reason: HOSPADM

## 2023-03-02 RX ORDER — NALOXONE HYDROCHLORIDE 0.4 MG/ML
0.4 INJECTION, SOLUTION INTRAMUSCULAR; INTRAVENOUS; SUBCUTANEOUS
Status: DISCONTINUED | OUTPATIENT
Start: 2023-03-02 | End: 2023-03-02 | Stop reason: HOSPADM

## 2023-03-02 RX ORDER — MISOPROSTOL 200 UG/1
800 TABLET ORAL
Status: DISCONTINUED | OUTPATIENT
Start: 2023-03-02 | End: 2023-03-02 | Stop reason: HOSPADM

## 2023-03-02 RX ORDER — KETOROLAC TROMETHAMINE 30 MG/ML
30 INJECTION, SOLUTION INTRAMUSCULAR; INTRAVENOUS
Status: DISCONTINUED | OUTPATIENT
Start: 2023-03-02 | End: 2023-03-03 | Stop reason: HOSPADM

## 2023-03-02 RX ORDER — OXYTOCIN 10 [USP'U]/ML
10 INJECTION, SOLUTION INTRAMUSCULAR; INTRAVENOUS
Status: DISCONTINUED | OUTPATIENT
Start: 2023-03-02 | End: 2023-03-03 | Stop reason: HOSPADM

## 2023-03-02 RX ORDER — BISACODYL 10 MG
10 SUPPOSITORY, RECTAL RECTAL DAILY PRN
Status: DISCONTINUED | OUTPATIENT
Start: 2023-03-02 | End: 2023-03-03 | Stop reason: HOSPADM

## 2023-03-02 RX ORDER — DOCUSATE SODIUM 100 MG/1
100 CAPSULE, LIQUID FILLED ORAL DAILY
Status: DISCONTINUED | OUTPATIENT
Start: 2023-03-02 | End: 2023-03-03 | Stop reason: HOSPADM

## 2023-03-02 RX ORDER — METHYLERGONOVINE MALEATE 0.2 MG/ML
200 INJECTION INTRAVENOUS
Status: DISCONTINUED | OUTPATIENT
Start: 2023-03-02 | End: 2023-03-03 | Stop reason: HOSPADM

## 2023-03-02 RX ORDER — MISOPROSTOL 200 UG/1
800 TABLET ORAL
Status: DISCONTINUED | OUTPATIENT
Start: 2023-03-02 | End: 2023-03-03 | Stop reason: HOSPADM

## 2023-03-02 RX ORDER — MISOPROSTOL 200 UG/1
400 TABLET ORAL
Status: DISCONTINUED | OUTPATIENT
Start: 2023-03-02 | End: 2023-03-03 | Stop reason: HOSPADM

## 2023-03-02 RX ORDER — MODIFIED LANOLIN
OINTMENT (GRAM) TOPICAL
Status: DISCONTINUED | OUTPATIENT
Start: 2023-03-02 | End: 2023-03-03 | Stop reason: HOSPADM

## 2023-03-02 RX ORDER — OXYTOCIN 10 [USP'U]/ML
10 INJECTION, SOLUTION INTRAMUSCULAR; INTRAVENOUS
Status: DISCONTINUED | OUTPATIENT
Start: 2023-03-02 | End: 2023-03-02 | Stop reason: HOSPADM

## 2023-03-02 RX ORDER — NALOXONE HYDROCHLORIDE 0.4 MG/ML
0.2 INJECTION, SOLUTION INTRAMUSCULAR; INTRAVENOUS; SUBCUTANEOUS
Status: DISCONTINUED | OUTPATIENT
Start: 2023-03-02 | End: 2023-03-02 | Stop reason: HOSPADM

## 2023-03-02 RX ORDER — IBUPROFEN 800 MG/1
800 TABLET, FILM COATED ORAL EVERY 6 HOURS PRN
Status: DISCONTINUED | OUTPATIENT
Start: 2023-03-02 | End: 2023-03-03 | Stop reason: HOSPADM

## 2023-03-02 RX ORDER — PROCHLORPERAZINE MALEATE 10 MG
10 TABLET ORAL EVERY 6 HOURS PRN
Status: DISCONTINUED | OUTPATIENT
Start: 2023-03-02 | End: 2023-03-02 | Stop reason: HOSPADM

## 2023-03-02 RX ORDER — ACETAMINOPHEN 325 MG/1
650 TABLET ORAL EVERY 4 HOURS PRN
Status: DISCONTINUED | OUTPATIENT
Start: 2023-03-02 | End: 2023-03-03 | Stop reason: HOSPADM

## 2023-03-02 RX ORDER — OXYTOCIN/0.9 % SODIUM CHLORIDE 30/500 ML
340 PLASTIC BAG, INJECTION (ML) INTRAVENOUS CONTINUOUS PRN
Status: DISCONTINUED | OUTPATIENT
Start: 2023-03-02 | End: 2023-03-03 | Stop reason: HOSPADM

## 2023-03-02 RX ORDER — CITRIC ACID/SODIUM CITRATE 334-500MG
30 SOLUTION, ORAL ORAL
Status: DISCONTINUED | OUTPATIENT
Start: 2023-03-02 | End: 2023-03-02 | Stop reason: HOSPADM

## 2023-03-02 RX ORDER — ONDANSETRON 4 MG/1
4 TABLET, ORALLY DISINTEGRATING ORAL EVERY 6 HOURS PRN
Status: DISCONTINUED | OUTPATIENT
Start: 2023-03-02 | End: 2023-03-02 | Stop reason: HOSPADM

## 2023-03-02 RX ORDER — OXYTOCIN/0.9 % SODIUM CHLORIDE 30/500 ML
340 PLASTIC BAG, INJECTION (ML) INTRAVENOUS CONTINUOUS PRN
Status: DISCONTINUED | OUTPATIENT
Start: 2023-03-02 | End: 2023-03-02 | Stop reason: HOSPADM

## 2023-03-02 RX ORDER — MISOPROSTOL 200 UG/1
400 TABLET ORAL
Status: DISCONTINUED | OUTPATIENT
Start: 2023-03-02 | End: 2023-03-02 | Stop reason: HOSPADM

## 2023-03-02 RX ORDER — METOCLOPRAMIDE HYDROCHLORIDE 5 MG/ML
10 INJECTION INTRAMUSCULAR; INTRAVENOUS EVERY 6 HOURS PRN
Status: DISCONTINUED | OUTPATIENT
Start: 2023-03-02 | End: 2023-03-02 | Stop reason: HOSPADM

## 2023-03-02 RX ORDER — HYDROCORTISONE 25 MG/G
CREAM TOPICAL 3 TIMES DAILY PRN
Status: DISCONTINUED | OUTPATIENT
Start: 2023-03-02 | End: 2023-03-03 | Stop reason: HOSPADM

## 2023-03-02 RX ORDER — CARBOPROST TROMETHAMINE 250 UG/ML
250 INJECTION, SOLUTION INTRAMUSCULAR
Status: DISCONTINUED | OUTPATIENT
Start: 2023-03-02 | End: 2023-03-02 | Stop reason: HOSPADM

## 2023-03-02 RX ORDER — PROCHLORPERAZINE 25 MG
25 SUPPOSITORY, RECTAL RECTAL EVERY 12 HOURS PRN
Status: DISCONTINUED | OUTPATIENT
Start: 2023-03-02 | End: 2023-03-02 | Stop reason: HOSPADM

## 2023-03-02 RX ORDER — METHYLERGONOVINE MALEATE 0.2 MG/ML
200 INJECTION INTRAVENOUS
Status: DISCONTINUED | OUTPATIENT
Start: 2023-03-02 | End: 2023-03-02 | Stop reason: HOSPADM

## 2023-03-02 RX ORDER — ONDANSETRON 2 MG/ML
4 INJECTION INTRAMUSCULAR; INTRAVENOUS EVERY 6 HOURS PRN
Status: DISCONTINUED | OUTPATIENT
Start: 2023-03-02 | End: 2023-03-02 | Stop reason: HOSPADM

## 2023-03-02 RX ORDER — CARBOPROST TROMETHAMINE 250 UG/ML
250 INJECTION, SOLUTION INTRAMUSCULAR
Status: DISCONTINUED | OUTPATIENT
Start: 2023-03-02 | End: 2023-03-03 | Stop reason: HOSPADM

## 2023-03-02 RX ORDER — METOCLOPRAMIDE 10 MG/1
10 TABLET ORAL EVERY 6 HOURS PRN
Status: DISCONTINUED | OUTPATIENT
Start: 2023-03-02 | End: 2023-03-02 | Stop reason: HOSPADM

## 2023-03-02 RX ADMIN — WITCH HAZEL: 500 SOLUTION RECTAL; TOPICAL at 08:41

## 2023-03-02 RX ADMIN — BENZOCAINE AND LEVOMENTHOL: 200; 5 SPRAY TOPICAL at 08:41

## 2023-03-02 RX ADMIN — ACETAMINOPHEN 650 MG: 325 TABLET ORAL at 18:32

## 2023-03-02 RX ADMIN — FENTANYL CITRATE 100 MCG: 50 INJECTION, SOLUTION INTRAMUSCULAR; INTRAVENOUS at 01:36

## 2023-03-02 RX ADMIN — Medication 340 ML/HR: at 03:36

## 2023-03-02 RX ADMIN — ACETAMINOPHEN 650 MG: 325 TABLET ORAL at 14:30

## 2023-03-02 RX ADMIN — IBUPROFEN 800 MG: 800 TABLET ORAL at 20:33

## 2023-03-02 RX ADMIN — TRANEXAMIC ACID 1 G: 1 INJECTION, SOLUTION INTRAVENOUS at 02:13

## 2023-03-02 RX ADMIN — IBUPROFEN 800 MG: 800 TABLET ORAL at 02:52

## 2023-03-02 RX ADMIN — DOCUSATE SODIUM 100 MG: 100 CAPSULE, LIQUID FILLED ORAL at 08:40

## 2023-03-02 RX ADMIN — ACETAMINOPHEN 650 MG: 325 TABLET ORAL at 06:52

## 2023-03-02 ASSESSMENT — ACTIVITIES OF DAILY LIVING (ADL)
ADLS_ACUITY_SCORE: 18
TOILETING_ISSUES: NO
ADLS_ACUITY_SCORE: 18
WALKING_OR_CLIMBING_STAIRS_DIFFICULTY: NO
WEAR_GLASSES_OR_BLIND: NO
DRESSING/BATHING_DIFFICULTY: NO
ADLS_ACUITY_SCORE: 18
ADLS_ACUITY_SCORE: 18
CONCENTRATING,_REMEMBERING_OR_MAKING_DECISIONS_DIFFICULTY: NO
ADLS_ACUITY_SCORE: 18
FALL_HISTORY_WITHIN_LAST_SIX_MONTHS: NO
DOING_ERRANDS_INDEPENDENTLY_DIFFICULTY: NO
DIFFICULTY_EATING/SWALLOWING: NO
ADLS_ACUITY_SCORE: 18
CHANGE_IN_FUNCTIONAL_STATUS_SINCE_ONSET_OF_CURRENT_ILLNESS/INJURY: NO
ADLS_ACUITY_SCORE: 18

## 2023-03-02 NOTE — PLAN OF CARE
VSS; bleeding WDL. SO at bedside and supportive. [place] towards infant. Ibuprofen given for PP pain. Voiding x1 post delivery. Discharge to home when medically stable.     Problem: Postpartum (Vaginal Delivery)  Goal: Successful Maternal Role Transition  Outcome: Progressing     Problem: Postpartum (Vaginal Delivery)  Goal: Hemostasis  Outcome: Progressing     Problem: Postpartum (Vaginal Delivery)  Goal: Absence of Infection Signs and Symptoms  Outcome: Progressing     Problem: Postpartum (Vaginal Delivery)  Goal: Anesthesia/Sedation Recovery  Outcome: Progressing     Problem: Postpartum (Vaginal Delivery)  Goal: Optimal Pain Control and Function  Outcome: Progressing

## 2023-03-02 NOTE — PLAN OF CARE
"  Problem: Plan of Care - These are the overarching goals to be used throughout the patient stay.    Goal: Plan of Care Review  Outcome: Progressing  Paw is bottle feeding formula. She voiced interest to breastfeed today with morning feed, but upon entering room Paw was bottle feeding and stated, \"He wouldn't take the breast.\" Offered to assist with next feeding around 12 and to call for help. Reviewed early hunger cues of baby, feeding frequency, appropriate feeding amounts for age and keeping baby warm.    Problem: Plan of Care - These are the overarching goals to be used throughout the patient stay.    Goal: Optimal Comfort and Wellbeing  Outcome: Progressing  Intervention: Monitor Pain and Promote Comfort  Rating pain 2/10 to perineum. Medicated spray, tucks and ice packs given. Utilizing PRN Tylenol and Ibuprofen. Explained how each works and how to use via .     Problem: Postpartum (Vaginal Delivery)  Goal: Successful Maternal Role Transition  Outcome: Progressing  Paw and significant other are meeting baby's needs. They are both attentive to baby with feedings, changes and holding. Paw was skin to skin with baby this morning. Both asking appropriate questions.   "

## 2023-03-02 NOTE — PLAN OF CARE
Patient assessments and vitals are WDL. Pain has been adequately controlled by tylenol. Pt is attentive to infant and active in cares. Pt is formula feeding baby. Patient is up ad gerry. Voiding without difficulty. Pt uses picture menu to order meals. PPMA turned in; score 0. Continue to monitor pain.      Problem: Plan of Care - These are the overarching goals to be used throughout the patient stay.    Goal: Optimal Comfort and Wellbeing  Outcome: Progressing  Intervention: Provide Person-Centered Care  Recent Flowsheet Documentation  Taken 3/2/2023 8929 by Madyson Mejia, RN  Trust Relationship/Rapport:   care explained   choices provided   questions answered   questions encouraged   thoughts/feelings acknowledged     Problem: Postpartum (Vaginal Delivery)  Goal: Successful Maternal Role Transition  Outcome: Progressing     Problem: Postpartum (Vaginal Delivery)  Goal: Optimal Pain Control and Function  Outcome: Progressing

## 2023-03-02 NOTE — H&P
Maternal Admission H&P  [facility]  Date of Admission: 3/1/2023  Date of Service: 3/2/2023    Name      Harman Abbott         1990  MRN       [id number]  PCP        Lucia Daily at [facility], [phone].    ________________________________________________________________________    Assessment and Plan:  32 year old  at 39w1d, in active labor. FHT category 1. Maternal vitals stable.    Baby AGA. Anticipate .    Group B strep: negative    History of postpartum hemorrhage: plan 2 PIV, TXA prior to delivery, type & screen, CBC    Breast and bottle    PP contraception: nexplanon  ________________________________________________________________________    Chief Complaint: active labor management.    HPI: Harman Abbott is a 32 year old woman at 39w1d, based on an Estimated Date of Delivery: Mar 8, 2023. She presents with contractions which started around 2200 on 3/1/23. She denies leakage of fluid, vaginal bleeding.      Patient Active Problem List    Diagnosis Date Noted     Tonsil stone 2023     Priority: Medium     History of postpartum hemorrhage, currently pregnant 2022     Priority: Medium     Gastroesophageal reflux disease with esophagitis without hemorrhage 2021     Priority: Medium     Calculus of gallbladder without cholecystitis without obstruction 2021     Priority: Medium      OB History    Para Term  AB Living   4 3 3 0 0 3   SAB IAB Ectopic Multiple Live Births   0 0 0 0 3      # Outcome Date GA Lbr Wilfredo/2nd Weight Sex Delivery Anes PTL Lv   4 Current            3 Term 18 40w0d 01:54 / 00:05 2.835 kg (6 lb 4 oz) F Vag-Spont None N LYDIA      Name: MOH,FEMALE-PAW      Apgar1: 9  Apgar5: 9   2 Term 02/16/15 38w3d 19:32 / 00:10 2.523 kg (5 lb 9 oz) F Vag-Spont None N LYDIA      Name: Nolvia Barger      Apgar1: 8  Apgar5: 9   1 Term 08 40w0d 07:00 2.9 kg (6 lb 6.3 oz) F Vag-Spont  [place] eMERGENCY dEPARTMENT encounter:    [facility]  [address] [facility]  [address] [place] [id number] [address] [facility]    CHIEF COMPLAINT:    Chief Complaint   Patient presents with   â¢ Headache New Onset on New Symptom       HPI:    This is a 80year old male who presented to the ED with the history of headache. The patient presents to the emergency department for the complaint of a headache located over the left temporal region. It is intermittent in nature and when it is present it is in immediate shooting sensation that last only seconds. The patient does not have any visual changes. No numbness or tingling. No recent falls or injuries. He does not take any blood thinners. He is currently being evaluated by neurology for normal pressure hydrocephalus. The patient does use a cane to walk but does not seem to be more dependent on it than usual according to his wife. The patient denies any ear pain or sinus congestion. He states that he is currently pain-free. He did not take anything for his headache prior to arrival. No additional complaints. ALLERGIES:    ALLERGIES:  No Known Allergies    CURRENT MEDICATIONS:    No current facility-administered medications for this encounter. Current Outpatient Medications   Medication Sig Dispense Refill   â¢ escitalopram (LEXAPRO) 20 MG tablet Take 1/2 tab for one week then increase to full tab daily 90 tablet 0   â¢ traZODone (DESYREL) 50 MG tablet Take 0.5 tablets by mouth nightly. 30 tablet 5   â¢ donepezil (ARICEPT) 5 MG tablet Take 1 tablet by mouth nightly. 30 tablet 1   â¢ amLODIPine (NORVASC) 5 MG tablet Take 1 tablet by mouth daily. 90 tablet 0   â¢ simvastatin (ZOCOR) 20 MG tablet Take 1 tablet by mouth nightly 90 tablet 0   â¢ loratadine (CLARITIN) 10 MG tablet Take 10 mg by mouth daily. â¢ Ginkgo Biloba 40 MG Tab Take 1 tablet by mouth daily.      â¢ Probiotic Product (PROBIOTIC DAILY PO) Take 1 tablet by mouth daily as None  LYDIA      Birth Comments: had long prodromal episode      Name: Chance Hammer     Review of Systems Negative except what is noted in HPI.   Past Medical History:   Diagnosis Date     Postpartum hemorrhage 02/11/2018      No past surgical history on file.Patient has no known allergies.  No family history on file.  Social History     Socioeconomic History     Marital status:      Spouse name: Not on file     Number of children: Not on file     Years of education: Not on file     Highest education level: Not on file   Occupational History     Not on file   Tobacco Use     Smoking status: Never     Passive exposure: Never     Smokeless tobacco: Never     Tobacco comments:     no passive exposure   Vaping Use     Vaping Use: Never used   Substance and Sexual Activity     Alcohol use: No     Drug use: No     Sexual activity: Yes     Partners: Male   Other Topics Concern     Not on file   Social History Narrative     Not on file     Social Determinants of Health     Financial Resource Strain: Not on file   Food Insecurity: Not on file   Transportation Needs: Not on file   Physical Activity: Not on file   Stress: Not on file   Social Connections: Not on file   Intimate Partner Violence: Not on file   Housing Stability: Not on file     Prior to Admission Medication List  Medications Prior to Admission   Medication Sig Dispense Refill Last Dose     calcium carbonate (TUMS) 500 MG chewable tablet Take 1 tablet (500 mg) by mouth daily as needed for heartburn 100 tablet 1      ferrous sulfate (FEROSUL) 325 (65 Fe) MG tablet Take 1 tablet (325 mg) by mouth daily (with breakfast) 90 tablet 3      Prenatal Vit-Fe Fumarate-FA (PRENATAL MULTIVITAMIN W/IRON) 27-0.8 MG tablet Take 1 tablet by mouth daily 90 tablet 3       Allergies  No Known Allergies  Immunization History   Administered Date(s) Administered     COVID-19 Vaccine (VeliQ) 04/10/2021     COVID-19,PF,Moderna Booster 12/08/2021     HPV Quadrivalent 04/02/2015,  needed. â¢ docusate sodium (COLACE) 100 MG capsule Take 100 mg by mouth 2 times daily. â¢ naproxen sodium (ALEVE) 220 MG tablet Take 220 mg by mouth daily. â¢ vitamin B-12 (CYANOCOBALAMIN) 1000 MCG tablet Take 1,000 mcg by mouth daily. â¢ aspirin 81 MG tablet Take 81 mg by mouth daily.          PAST MEDICAL HISTORY:    Past Medical History:   Diagnosis Date   â¢ Agitated depression (CMS/[facility]) 8/19/2015   â¢ Arthritis    â¢ Cerebral infarction (CMS/[facility])     2006, no residual effects   â¢ Colon polyps 3/20/2015   â¢ Dementia 12/10/2018   â¢ Essential hypertension, benign 2/14/2014   â¢ GERD (gastroesophageal reflux disease) 2/14/2014   â¢ History of CVA (cerebrovascular accident) 2/14/2014   â¢ [place] (hard of hearing)    â¢ Keratosis, actinic    â¢ MCI (mild cognitive impairment) 9/16/2015    Dr. Geo Ayala follows   â¢ Other and unspecified hyperlipidemia 2/14/2014   â¢ Peripheral vascular disease (CMS/[facility]) 2/14/2014   â¢ Personal history of other diseases of circulatory system 2/14/2014   â¢ Squamous cell carcinoma        SURGICAL HISTORY:    Past Surgical History:   Procedure Laterality Date   â¢ Colonoscopy  3/20/15   â¢ Colonoscopy remove lesion hot bx or cautery  05/29/2008   â¢ Hernia repair     â¢ Rectal surgery     â¢ Remv cataract extracap insert lens Right 06/05/2017    Cataract Removal Lens Implant   â¢ Remv cataract extracap insert lens Left 06/12/2017    Cataract Removal Lens Implant   â¢ Shoulder arthroscopy      with RCR January 2015       SOCIAL HISTORY:    Social History     Socioeconomic History   â¢ Marital status: /Civil Union     Spouse name: Martinez Pauslon   â¢ Number of children: 3   â¢ Years of education: None   â¢ Highest education level: None   Social Needs   â¢ Financial resource strain: None   â¢ Food insecurity - worry: None   â¢ Food insecurity - inability: None   â¢ Transportation needs - medical: None   â¢ Transportation needs - non-medical: None   Occupational History   â¢ Occupation: retired   Tobacco Use   â¢ 06/02/2015, 10/05/2015     HepA-Adult 04/02/2015, 11/09/2015     HepB, Unspecified 08/21/2014, 10/07/2014     HepB-Adult 04/02/2015     Influenza Vaccine >6 months (Alfuria,Fluzone) 10/05/2015, 12/07/2016, 09/07/2017, 03/08/2019, 11/12/2019, 09/22/2022     Influenza Vaccine, 6+MO IM (QUADRIVALENT W/PRESERVATIVES) 12/19/2014     Influenza,INJ,MDCK,PF,Quad >6mo(Flucelvax) 11/06/2021     Tdap (Adacel,Boostrix) 08/21/2014, 12/19/2014, 01/06/2015, 03/09/2016, 12/12/2017, 01/19/2023      Physical Exam  No data found.  Wt Readings from Last 1 Encounters:   02/16/23 75.2 kg (165 lb 12.8 oz)   Prepregnancy: 69.4 kg (153 lb), BMI 28.23. Total gain: 5.806 kg (12 lb 12.8 oz) (expected gain: 7 kg (15 lb)-11.5 kg (25 lb)).    HEART: RRR, no murmur  LUNGS: CTA bilaterally  Fetal Heart Tones: Baseline 135 bpm, variability moderate (6-25 bpm), no decelerations. Reactive.  CONTRACTIONS:  Regular every 2 minutes.  CERVIX: per L&D RN: dilation 4-5 cm , 70% effaced, soft, and -1 station.  FLUID: None noted.  Fetal Presentation: vertex.  Labs    No visits with results within 1 Day(s) from this visit.   Latest known visit with results is:   Prenatal Office Visit on 02/09/2023   Component Date Value Ref Range Status     Group B Strep PCR 02/09/2023 Negative  Negative Final    Presumed negative for Streptococcus agalactiae (Group B Streptococcus) or the number of organisms may be below the limit of detection of the assay.      Group B Strep PCR   Date Value Ref Range Status   02/09/2023 Negative Negative Final     Comment:     Presumed negative for Streptococcus agalactiae (Group B Streptococcus) or the number of organisms may be below the limit of detection of the assay.      Antibody Screen   Date Value Ref Range Status   08/24/2022 Negative Negative Final     Hepatitis B Surface Antigen   Date Value Ref Range Status   08/24/2022 Nonreactive Nonreactive Final     Chlamydia trachomatis   Date Value Ref Range Status   08/24/2022 Negative  Smoking status: Never Smoker   â¢ Smokeless tobacco: Never Used   Substance and Sexual Activity   â¢ Alcohol use: Yes     Alcohol/week: 0.0 - 1.2 oz     Comment: occassional   â¢ Drug use: No   â¢ Sexual activity: None   Other Topics Concern   â¢  Service Not Asked   â¢ Blood Transfusions Not Asked   â¢ Caffeine Concern Not Asked   â¢ Occupational Exposure Not Asked   â¢ Hobby Hazards Not Asked   â¢ Sleep Concern Not Asked   â¢ Stress Concern Not Asked   â¢ Weight Concern Not Asked   â¢ Special Diet Not Asked   â¢ Back Care Not Asked   â¢ Exercise Not Asked   â¢ Bike Helmet Not Asked   â¢ Seat Belt Yes   â¢ Self-Exams Not Asked   Social History Narrative   â¢ None       FAMILY HISTORY:    Family History   Problem Relation Age of Onset   â¢ Asthma Sister    â¢ Arthritis Brother    â¢ Heart disease Brother         CAD/bypass   â¢ Kidney disease Brother    â¢ Hypertension Brother    â¢ Birth defects Brother         Downs syndrome   â¢ Mental retardation Brother         downs syndrome   â¢ Cancer Brother         stomach   â¢ Learning disabilities Brother         Downs syndrome   â¢ Patient is unaware of any medical problems Mother    â¢ Patient is unaware of any medical problems Father    â¢ Patient is unaware of any medical problems Daughter    â¢ Patient is unaware of any medical problems Son    â¢ Patient is unaware of any medical problems Maternal Grandmother    â¢ Patient is unaware of any medical problems Maternal Grandfather    â¢ Patient is unaware of any medical problems Paternal Grandmother    â¢ Patient is unaware of any medical problems Paternal Grandfather    â¢ Patient is unaware of any medical problems Daughter    â¢ Dementia/Alzheimers Sister    â¢ Cancer Sister         bladder   â¢ Patient is unaware of any medical problems Sister    â¢ Patient is unaware of any medical problems Sister    â¢ Patient is unaware of any medical problems Sister        REVIEW OF SYSTEMS:    As above per the HPI.   All other systems reviewed and otherwise Negative Final     Comment:     A negative result by transcription mediated amplification does not preclude the presence of C. trachomatis infection because results are dependent on proper and adequate collection, absence of inhibitors and sufficient rRNA to be detected.     Neisseria gonorrhoeae   Date Value Ref Range Status   08/24/2022 Negative Negative Final     Comment:     Negative for N. gonorrhoeae rRNA by transcription mediated amplification. A negative result by transcription mediated amplification does not preclude the presence of C. trachomatis infection because results are dependent on proper and adequate collection, absence of inhibitors and sufficient rRNA to be detected.     Hemoglobin   Date Value Ref Range Status   01/19/2023 11.2 (L) 11.7 - 15.7 g/dL Final        Completed by:   Lucia Daily MD  [facility]  3/2/2023 12:17 AM   used: Damian     negative. Constitutional: Negative for fever and chills. Skin: Negative for rash. HEENT: Negative for ear pain or sore throat. Respiratory: Negative cough or shortness of breath. Cardiovascular: Negative for chest pain or chest pressure. Gastrointestinal: Negative for nausea, vomiting, diarrhea or abdominal pain. Genitourinary: Negative for dysuria, urgency or frequency. Extremities:  Negative for joint swelling or joint pain. CNS: Negative for dizziness. Positive for headache. PHYSICAL EXAM:   ED Triage Vitals [01/14/19 1739]   /67   Pulse 69   Resp 16   Temp 99 Â°F (37.2 Â°C)   SpO2 97 %      Pulse Ox Interpretation: Within normal limits. General: Alert, awake. No acute distress. Skin:  Warm and dry without rash. Head:  Normocephalic-atraumatic. Neck:  Supple. No adenopathy. No JVD. Eye:  Normal conjunctiva and sclera. EENT: Mucous membranes are moist. No pharyngeal erythema or exudates. Cardiovascular: Symmetrical pulses. RRR without murmur. Normal peripheral pulses. No peripheral edema bilaterally. Respiratory:  Normal respiratory effort. CTA. Symmetrical expansion bilaterally. Gastrointestinal: soft, nontender, Non-distended. Normal bowel sounds. Musculoskeletal:  No deformity or calf tenderness. Neuro: Orientated x person and place and situation. No focal deficits. Psych: Patient is cooperative      ED Course/ MDM:    The patient is neurologically intact at this time. He actually is pain-free at this time. The way he is describing his pain does not sound as though is going to be related to any serious intracranial pathology, but the patient did undergo a CT scan of the head for further evaluation. He does seem to be quite anxious and his wife confirms that due to the dementia, he does harbor a great deal of anxiety such that he tends to fixate on any minor issues. She was concerned that some of this might be related to his anxiety.  The CT scan of the head was unremarkable. We did administer some Tylenol for any potential ongoing headache. He also tolerated his normal nighttime medications in the emergency department without difficulty. They will follow-up with his primary care provider and his neurologist as scheduled. They may return to the emergency room for any worsening. Labs  Results for orders placed or performed during the hospital encounter of 01/14/19   Metered blood glucose   Result Value    Glucose Bedside  (H)       I have reviewed labs      Radiology, Images  CT Head Brain   Final Result   IMPRESSION:   1. No acute findings. 2. Mild chronic small vessel disease. 3. Stable remote infarct right occipital region. I have reviewed radiology images, pending official radiology interpretation    ED Medications  ED Medication Orders (From admission, onward)    Start Ordered     Status Ordering Provider    01/14/19 1826 01/14/19 1825  acetaminophen (TYLENOL) tablet 1,000 mg  ONCE      Last MAR action:  Given Caroline ECHEVERRIA          Diagnosis  The primary encounter diagnosis was Acute nonintractable headache, unspecified headache type. Diagnoses of Essential hypertension, benign and History of CVA (cerebrovascular accident) were also pertinent to this visit. Follow Up:  Los Ramirez MD  [address] [place] [address]  [phone]    Schedule an appointment as soon as possible for a visit      [facility] Department  [id number] [place] [id number] [phone]    If symptoms worsen    Patient advised to follow up In ER in case of worsening or new symptoms. Patient agrees with plan.        Amarilis Patel MD  01/14/19 Claire Caruso

## 2023-03-02 NOTE — L&D DELIVERY NOTE
OB Vaginal Delivery Note    Harman Abbott MRN# [id number]   Age: 32 year old YOB: 1990       GA: 39w1d  GP:   Labor Complications: None   Delivery QBL:  25 mL at time of note  Delivery Type: Vaginal, Spontaneous   ROM to Delivery Time: (Delivered) Hours: 1 Minutes: 46  [place] Weight:     1 Minute 5 Minute 10 Minute   Apgar Totals:            MARIA E ALEXANDER;ANAYELI APONTE     Delivery Details:  Harman Abbott, a 32 year old  female delivered a viable infant. On arrival to hospital, patient was 4 cm dilated. She progressed to 6 cm and requested AROM to expedite labor. AROM with small amount of blood tinged fluid. She then had rupture of forebag and felt strong urge to push. She was noted to be 8 cm but breathed through a few more contractions and was noted to have anterior lip that was reduced as patient began pushing. She pushed effectively and head delivered, restituted and compound presentation was noted with hand. Anterior and posterior shoulders delivered easily and nuchal cord was noted but delivered through.Delivery was via vaginal, spontaneous  to a sterile field under intravenous  anesthesia- she received 1 dose of fentanyl prior to delivery. Infant delivered in vertex  left  occiput  anterior  position. The cord was clamped, cut twice and 3 vessels  were noted. Cord blood was obtained in routine fashion with the following disposition: lab .      Cord complications: nuchal   Placenta delivered at 3/2/2023  2:41 AM . Placental disposition was Hospital disposal . Fundal massage performed and fundus found to be firm.     Episiotomy: none    Perineum, vagina, cervix were inspected, and the following lacerations were noted:   Perineal lacerations: none                  Excellent hemostasis was noted. Needle count correct. Infant and patient in delivery room in good and stable condition.        Moh, Male-Paw [[id number]]    Labor Event Times    Labor onset date: 3/1/23 Onset time:   9:00 PM   Dilation complete date: 3/2/23 Complete time:  2:28 AM   Start pushing date/time: 3/2/2023 0228      Labor Events     labor?: No   steroids: None  Labor Type: Spontaneous  Predominate monitoring during 1st stage: continuous electronic fetal monitoring, intermittent auscultation     Antibiotics received during labor?: No     Rupture date/time: 3/2/23 0052   Rupture type: Artificial Rupture of Membranes  Fluid color: Clear  Fluid odor: Normal     Delivery/Placenta Date and Time    Delivery Date: 3/2/23 Delivery Time:  2:38 AM   Placenta Date/Time: 3/2/2023  2:41 AM  Oxytocin given at the time of delivery: after delivery of baby  Delivering clinician: Lucia Daily MD   Other personnel present at delivery:  Provider Role   Shruthi Valente RN Malmgren, Leah, RN          Vaginal Counts     Initial count performed by 2 team members:  Two Team Members   Lucia Valente       Needles Suture Needles Sponges (RETIRED) Instruments   Initial counts 0 0 5    Added to count 0 0 0    Relief counts       Final counts 0 0 5          Placed during labor Accounted for at the end of labor   FSE NA NA   IUPC NA NA   Cervidil NA NA              Final count performed by 2 team members:  Two Team Members   Shruthi Daily      Final count correct?: Yes     Apgars    Living status: Living   1 Minute 5 Minute 10 Minute 15 Minute 20 Minute   Skin color:        Heart rate:        Reflex irritability:        Muscle tone:        Respiratory effort:        Total:           Cord    Vessels: 3 Vessels    Cord Complications: Nuchal   Nuchal Intervention: delivered through         Nuchal cord description: loose nuchal cord         Cord Blood Disposition: Lab    Gases Sent?: No    Delayed cord clamping?: Yes    Cord Clamping Delay (seconds): 31-60 seconds    Stem cell collection?: No       [place] Resuscitation    Methods: None     Skin to Skin and Feeding Plan    Skin to skin  initiation date/time: 1/3/1841    Skin to skin with: Mother  Skin to skin end date/time:        Labor Events and Shoulder Dystocia    Fetal Tracing Prior to Delivery: Category 2  Shoulder dystocia present?: Neg     Delivery (Maternal) (Provider to Complete) ([id number])    Episiotomy: None  Perineal lacerations: None    Repair suture: None  Genital tract inspection done: Pos     Blood Loss  Mother: Harman Abbott [facility] #[id number]   Start of Mother's Information    Delivery Blood Loss  03/01/23 2100 - 03/02/23 0305    None           End of Mother's Information  Mother: Harman Abbott [facility] #[id number]          Delivery - Provider to Complete ([id number])    Delivering clinician: Lucia Daily MD  Attempted Delivery Types (Choose all that apply): Spontaneous Vaginal Delivery  Delivery Type (Choose the 1 that will go to the Birth History): Vaginal, Spontaneous                   Other personnel:  Provider Role   Shruthi Valente RN MalmgBhumika pineda, RN                 Placenta    Date/Time: 3/2/2023  2:41 AM  Removal: Spontaneous  Disposition: Hospital disposal           Anesthesia    Method: INTRAVENOUS                 Presentation and Position    Presentation: Vertex    Position: Left Occiput Anterior                 Lucia Daily MD

## 2023-03-02 NOTE — PROGRESS NOTES
39.1 weeks gestation arrives to unit accompanied by SO. Patient reports contractions since . Denies LOF or bleeding. Reports active fetal movement. Uncomplicated pregnancy; HX PPH with last baby. Admission completed with in person Nabila . Dr. Daily notified of patient admission and orders placed.

## 2023-03-03 VITALS
TEMPERATURE: 98.1 F | SYSTOLIC BLOOD PRESSURE: 102 MMHG | DIASTOLIC BLOOD PRESSURE: 64 MMHG | HEART RATE: 81 BPM | OXYGEN SATURATION: 97 % | RESPIRATION RATE: 18 BRPM

## 2023-03-03 PROCEDURE — 99207 PR SUBSEQUENT HOSPITAL CARE FOR MOTHER, 15 MINUTES: CPT | Performed by: FAMILY MEDICINE

## 2023-03-03 PROCEDURE — 250N000013 HC RX MED GY IP 250 OP 250 PS 637: Performed by: FAMILY MEDICINE

## 2023-03-03 RX ORDER — DOCUSATE SODIUM 100 MG/1
100 CAPSULE, LIQUID FILLED ORAL DAILY
Qty: 30 CAPSULE | Refills: 0 | Status: SHIPPED | OUTPATIENT
Start: 2023-03-04 | End: 2023-04-26

## 2023-03-03 RX ORDER — IBUPROFEN 800 MG/1
800 TABLET, FILM COATED ORAL EVERY 6 HOURS PRN
Qty: 30 TABLET | Refills: 0 | Status: SHIPPED | OUTPATIENT
Start: 2023-03-03 | End: 2023-04-26

## 2023-03-03 RX ADMIN — DOCUSATE SODIUM 100 MG: 100 CAPSULE, LIQUID FILLED ORAL at 08:29

## 2023-03-03 RX ADMIN — ACETAMINOPHEN 650 MG: 325 TABLET ORAL at 08:29

## 2023-03-03 RX ADMIN — IBUPROFEN 800 MG: 800 TABLET ORAL at 08:29

## 2023-03-03 ASSESSMENT — ACTIVITIES OF DAILY LIVING (ADL)
ADLS_ACUITY_SCORE: 18

## 2023-03-03 NOTE — PROGRESS NOTES
"Outreach Nurse Note    [facility]  [id number]  1990    Chart reviewed, discharge plan discussed with patient, needs assessed. Patient requests home care visit, nurse visit planned for , 3/5/2023, Home Care Intake updated by this writer. Follow-up post-delivery appointment planned in 2 and 6  weeks at [facility].    Patient is reported to have support at home and is ready to discharge today with , \"Filiep Hla\". Outreach RN will continue to follow and assist if needed with discharge plan. No additional needs identified at this time.    "

## 2023-03-03 NOTE — PLAN OF CARE
Problem: Plan of Care - These are the overarching goals to be used throughout the patient stay.    Goal: Readiness for Transition of Care  Outcome: Met   Vital signs stable.  Patient stated she is ready for discharge.  All questioned asked and answered with an .  Patient declined purchase of breast pump.  Patient discharged to home with  and infant.

## 2023-03-03 NOTE — DISCHARGE SUMMARY
Maternal Discharge Summary  [facility]  Date of Service: 3/3/2023    Name      Harman Abbott         1990  MRN       [id number]  PCP        Lucia Onofre at [facility], [phone].    Admission Date:  3/1/2023  Discharge Date:  3/3/2023  ________________________________________________________________________    Assessment:  Harman Abbott is a 32 year old now  s/p vaginal, spontaneous  at 39w1d on 3/2/23.  Principal Problem:     (normal spontaneous vaginal delivery)  Active Problems:    Pregnancy    Lactating mother        Discharge Plan:   1. Discharge to Home. Condition at Discharge:  Stable  2. Physical activity:as tolerated  Watch for postpartum warning signs as discussed  3. Diet:  as tolerated  4. Home care nurse: ordered for 1-2 days from now.  5. Lactation clinic appointment: ordered.  6. Contraception plan: Nexplanon.  7. Follow up with Lucia Onofre in 4 weeks.  Future Appointments   Date Time Provider Department Center   3/9/2023  2:40 PM Lucia Daily MD [facility]      8.   Immunizations: needs covid booster     ________________________________________________________________________      Estimated Date of Delivery: Data Unavailable  Gestational Age at Delivery: 39w1d    Principal Diagnosis: Labor and delivery  Delivery type: Vaginal, Spontaneous     Subjective:  Postpartum day #1 s/p NVD  Patient doing well with no concerns. Lochia is mild without clots.  Pain is well controlled with Ibuprofen.  Eating and ambulating well. Normal urine output. Normal mood and affect.  Patient denies headache, dizziness, dyspnea, and leg pain.   Lactation mom is pumping   The baby is well and being fed by bottle.     Discharge Exam:  Patient Vitals for the past 24 hrs:   BP Temp Temp src Pulse Resp SpO2   23 0741 102/64 98.1  F (36.7  C) Axillary 81 18 97 %   23 0330 99/62 97.9  F (36.6  C) Oral 81 18 96 %    03/02/23 2037 107/57 98.9  F (37.2  C) Oral 75 18 97 %   03/02/23 1550 92/56 -- -- -- -- --   03/02/23 1545 90/54 98.8  F (37.1  C) Oral -- 18 96 %   03/02/23 1140 106/60 99  F (37.2  C) Oral -- 16 97 %     General - alert, comfortable  Heart - RRR, no murmurs  Lungs - CTA bilaterally  Abdomen - fundus firm, nontender, below umbilicus  Extremities - trace edema  Admission on 03/01/2023   Component Date Value Ref Range Status     WBC Count 03/02/2023 9.8  4.0 - 11.0 10e3/uL Final     RBC Count 03/02/2023 4.41  3.80 - 5.20 10e6/uL Final     Hemoglobin 03/02/2023 12.4  11.7 - 15.7 g/dL Final     Hematocrit 03/02/2023 38.7  35.0 - 47.0 % Final     MCV 03/02/2023 88  78 - 100 fL Final     MCH 03/02/2023 28.1  26.5 - 33.0 pg Final     MCHC 03/02/2023 32.0  31.5 - 36.5 g/dL Final     RDW 03/02/2023 13.9  10.0 - 15.0 % Final     Platelet Count 03/02/2023 251  150 - 450 10e3/uL Final     ABO/RH(D) 03/02/2023 O POS   Final     Antibody Screen 03/02/2023 Negative  Negative Final     SPECIMEN EXPIRATION DATE 03/02/2023 [id number]   Final     Hold Specimen 03/02/2023 JIC   Final     Hold Specimen 03/02/2023 JIC   Final      Discharge Medications:   See medication reconciliation.     Completed by:   Ene Lara MD  [facility]  3/3/2023 10:35 AM   used: Damian

## 2023-03-03 NOTE — DISCHARGE INSTRUCTIONS
You have a Home Care nurse visit planned for Sunday, 3/5/2023. The nurse will contact you after discharge to confirm the appointment time. If you do not hear from the nurse by Sunday morning, please call [phone]. Do not schedule a clinic appointment on the same day as home nurse visit.

## 2023-03-15 ENCOUNTER — PRENATAL OFFICE VISIT (OUTPATIENT)
Dept: FAMILY MEDICINE | Facility: CLINIC | Age: 33
End: 2023-03-15
Payer: COMMERCIAL

## 2023-03-15 VITALS
TEMPERATURE: 98.7 F | RESPIRATION RATE: 18 BRPM | HEART RATE: 86 BPM | BODY MASS INDEX: 28.16 KG/M2 | DIASTOLIC BLOOD PRESSURE: 70 MMHG | OXYGEN SATURATION: 98 % | SYSTOLIC BLOOD PRESSURE: 100 MMHG | HEIGHT: 62 IN | WEIGHT: 153 LBS

## 2023-03-15 PROBLEM — Z87.59 HISTORY OF POSTPARTUM HEMORRHAGE: Status: ACTIVE | Noted: 2023-03-15

## 2023-03-15 PROBLEM — O09.299 HISTORY OF POSTPARTUM HEMORRHAGE, CURRENTLY PREGNANT: Status: RESOLVED | Noted: 2022-08-24 | Resolved: 2023-03-15

## 2023-03-15 PROBLEM — Z34.90 PREGNANCY: Status: RESOLVED | Noted: 2023-03-02 | Resolved: 2023-03-15

## 2023-03-15 PROCEDURE — 99207 PR POST PARTUM EXAM: CPT | Performed by: FAMILY MEDICINE

## 2023-03-15 NOTE — PROGRESS NOTES
Assessment/Plan:     Harman was seen today for post partum exam.    Diagnoses and all orders for this visit:    Routine postpartum follow-up        Anemia:  Normal antepartum hemoglobin with no excessive blood loss  Breastfeeding/Bottle feeding:  Pt is breasfeeding, discussed how to support  Contraception:   Plans nexplanon- schedule at her next postpartum visit  Depression:   See [facility] Depresion survey  Exercise:   Encouraged increasing exercise based on how she is feeling.  Healthcare maintenance:   Up to date  Immunizations:    Immunizations up to date               Subjective:      Harman Abbott is a 32 year old female who presents for a postpartum visit.   She is postpartum following a spontaneous vaginal delivery.   I have fully reviewed the prenatal and intrapartum course.   Delivery notes below  Postpartum course has been Unremarkable.  Baby's course has been Uncomplicated.  Periods: still having vaginal bleeding, light without clots  Bowel or bladder concerns: denies  Patient has not resumed intercourse.    [place]  Depression Scale: 0    Last hgb on file:    Lab Results   Component Value Date    HGB 12.4 2023        The following portions of the patient's history were reviewed and updated as appropriate: allergies, current medications and problem list     OB History    Para Term  AB Living   4 4 4 0 0 4   SAB IAB Ectopic Multiple Live Births   0 0 0 0 4      # Outcome Date GA Lbr Wilfredo/2nd Weight Sex Delivery Anes PTL Lv   4 Term 23 39w1d 05:28 / 00:10 3.3 kg (7 lb 4.4 oz) M Vag-Spont IV N LYDIA      [place] [facility]      Apgar1: 8  Apgar5: 9   3 Term 18 40w0d 01:54 / 00:05 2.835 kg (6 lb 4 oz) F Vag-Spont None N LYDIA      [place] [facility]      Apgar1: 9  Apgar5: 9   2 Term 02/16/15 38w3d 19:32 / 00:10 2.523 kg (5 lb 9 oz) F Vag-Spont None N LYDIA      Name: Nolvia Barger      Apgar1: 8  Apgar5: 9   1 Term / 40w0d 07:00 2.9 kg (6 lb 6.3 oz) F Vag-Spont  "None  LYDIA      Birth Comments: had long prodromal episode      Name: Chance Jackson Harman     Information for the patient's :  Filipe Owen [[id number]]   Filipe Owen       Objective:      /70   Pulse 86   Temp 98.7  F (37.1  C) (Oral)   Resp 18   Ht 1.568 m (5' 1.73\")   Wt 69.4 kg (153 lb)   LMP 2022 (Approximate)   SpO2 98%   Breastfeeding Yes   BMI 28.23 kg/m     Wt Readings from Last 3 Encounters:   03/15/23 69.4 kg (153 lb)   23 75.2 kg (165 lb 12.8 oz)   23 73.5 kg (162 lb)       Physical Exam:  General Appearance: Alert, cooperative, no distress, appears stated age  Lungs: Clear to auscultation bilaterally, respirations unlabored  Heart: Regular rate and rhythm, S1 and S2 normal, no murmur, rub, or gallop  Abdomen: Soft, non-tender, no masses, no organomegaly  Pelvic: Deferred- check at 6 week postpartum visit per patient request     Lucia Daily MD  "

## 2023-04-26 ENCOUNTER — OFFICE VISIT (OUTPATIENT)
Dept: FAMILY MEDICINE | Facility: CLINIC | Age: 33
End: 2023-04-26
Payer: COMMERCIAL

## 2023-04-26 VITALS
RESPIRATION RATE: 18 BRPM | TEMPERATURE: 98.2 F | BODY MASS INDEX: 29.08 KG/M2 | HEIGHT: 62 IN | DIASTOLIC BLOOD PRESSURE: 71 MMHG | HEART RATE: 108 BPM | OXYGEN SATURATION: 100 % | WEIGHT: 158.06 LBS | SYSTOLIC BLOOD PRESSURE: 106 MMHG

## 2023-04-26 DIAGNOSIS — Z30.017 NEXPLANON INSERTION: ICD-10-CM

## 2023-04-26 DIAGNOSIS — Z97.5 NEXPLANON IN PLACE: ICD-10-CM

## 2023-04-26 DIAGNOSIS — Z30.017 NEXPLANON INSERTION: Primary | ICD-10-CM

## 2023-04-26 DIAGNOSIS — Z30.017 INSERTION OF IMPLANTABLE SUBDERMAL CONTRACEPTIVE: Primary | ICD-10-CM

## 2023-04-26 LAB — HCG UR QL: NEGATIVE

## 2023-04-26 PROCEDURE — 11981 INSERTION DRUG DLVR IMPLANT: CPT | Performed by: FAMILY MEDICINE

## 2023-04-26 PROCEDURE — 81025 URINE PREGNANCY TEST: CPT | Performed by: FAMILY MEDICINE

## 2023-04-26 RX ORDER — AMOXICILLIN 500 MG/1
CAPSULE ORAL
COMMUNITY
Start: 2023-02-24 | End: 2024-03-28

## 2023-04-26 NOTE — PROGRESS NOTES
[facility]  DOS: 04/26/2023  Provider: Lucia Daily   used: Malika       SUBJECTIVE:    HPI:    32 year old female presenting today for Nexplanon insertion.     She is right hand dominant.  She is breastfeeding.  Current contraception: none  Last sexual activity: She has not been sexually active since birth of her child    PMH:  No known contraindications to Nexplanon      No current or past history of thrombosis or thromboembolic disorders       No hepatic tumors or active liver disease       No undiagnosed abnormal genital bleeding       No known or suspected carcinoma of the breast or personal history of breast cancer       No hypersensitivity to any of the components of NEXPLANON      No known history of keloids    ROS: 10 point review of symptoms otherwise negative except as detailed in HPI.     OBJECTIVE:    LABS: UPT NEG (contraception start)    Office Visit on 04/26/2023   Component Date Value Ref Range Status     hCG Urine Qualitative 04/26/2023 Negative  Negative Final    This test is for screening purposes.  Results should be interpreted along with the clinical picture.  Confirmation testing is available if warranted by ordering ZFB255, HCG Quantitative Pregnancy.       ASSESSMENT:   We reviewed the Nexplanon literature and counseling re: full range of contraceptive options. She denies any contraindications to its use. We discussed that her bleeding profile will change. She is aware of 3 year effectiveness of this method.  She is aware of potential side effects including infection at site, intermenstrual bleeding, irregular bleeding, amenorrhea, need for minor surgical procedure to remove or more extensive if implant is deep    Appropriate candidate for Nexplanon    PLAN & PROCEDURE NOTE:  She elected to proceed with the placement after the risks and benefits were discussed. Denies allergy to local anesthesia, keloid formation.     Consent signed and will be scanned in  EMR.     After cleansing left (non-dominant) arm above the elbow, 2 mL of 1% Lidocaine was administered along the planned injection site for Nexplanon. Insertion site cleansed with iodine. Nexplanon was then inserted 8cm above the medial epicondyle of the humerus- in same area where she had previous one. Palpation revealed subdermal placement; the patient was able to feel implant as well.     Bleeding was minimal and a pressure dressing was applied with instructions to leave this in place for 24 hours. Pt was instructed to observe for signs/symptoms of any infection (localized tenderness, pain, inflammation) or excessive bruising.  No apparent distress at completion of procedure. No complications.      Established patient contraception counseling/consult and Nexplanon insertion    Lucia Daily MD

## 2023-06-06 DIAGNOSIS — Z32.01 PREGNANCY TEST POSITIVE: ICD-10-CM

## 2023-06-07 RX ORDER — PRENATAL VIT/IRON FUM/FOLIC AC 27MG-0.8MG
1 TABLET ORAL DAILY
Qty: 30 TABLET | Refills: 3 | OUTPATIENT
Start: 2023-06-07

## 2023-06-07 NOTE — TELEPHONE ENCOUNTER
Outpatient Medication Detail     Disp Refills Start End MECHELLE   Prenatal Vit-Fe Fumarate-FA (PRENATAL MULTIVITAMIN W/IRON) 27-0.8 MG tablet (Discontinued) 90 tablet 3 7/14/2022 4/26/2023 --   Sig - Route: Take 1 tablet by mouth daily - Oral   Patient not taking: Reported on 3/15/2023

## 2024-03-28 ENCOUNTER — OFFICE VISIT (OUTPATIENT)
Dept: FAMILY MEDICINE | Facility: CLINIC | Age: 34
End: 2024-03-28
Payer: COMMERCIAL

## 2024-03-28 VITALS
TEMPERATURE: 99.1 F | BODY MASS INDEX: 28.45 KG/M2 | OXYGEN SATURATION: 98 % | HEIGHT: 62 IN | WEIGHT: 154.6 LBS | HEART RATE: 89 BPM | RESPIRATION RATE: 20 BRPM | DIASTOLIC BLOOD PRESSURE: 63 MMHG | SYSTOLIC BLOOD PRESSURE: 98 MMHG

## 2024-03-28 DIAGNOSIS — G43.909 MIGRAINE WITHOUT STATUS MIGRAINOSUS, NOT INTRACTABLE, UNSPECIFIED MIGRAINE TYPE: ICD-10-CM

## 2024-03-28 DIAGNOSIS — Z87.19 HISTORY OF CHOLELITHIASIS: ICD-10-CM

## 2024-03-28 DIAGNOSIS — Z00.00 ROUTINE GENERAL MEDICAL EXAMINATION AT A HEALTH CARE FACILITY: Primary | ICD-10-CM

## 2024-03-28 PROCEDURE — 99395 PREV VISIT EST AGE 18-39: CPT | Performed by: FAMILY MEDICINE

## 2024-03-28 PROCEDURE — 99213 OFFICE O/P EST LOW 20 MIN: CPT | Mod: 25 | Performed by: FAMILY MEDICINE

## 2024-03-28 RX ORDER — IBUPROFEN 600 MG/1
600 TABLET, FILM COATED ORAL DAILY PRN
Qty: 50 TABLET | Refills: 2 | Status: ON HOLD | OUTPATIENT
Start: 2024-03-28 | End: 2024-04-29

## 2024-03-28 RX ORDER — SUMATRIPTAN 25 MG/1
25 TABLET, FILM COATED ORAL
Qty: 20 TABLET | Refills: 3 | Status: SHIPPED | OUTPATIENT
Start: 2024-03-28

## 2024-03-28 SDOH — HEALTH STABILITY: PHYSICAL HEALTH: ON AVERAGE, HOW MANY DAYS PER WEEK DO YOU ENGAGE IN MODERATE TO STRENUOUS EXERCISE (LIKE A BRISK WALK)?: 0 DAYS

## 2024-03-28 SDOH — HEALTH STABILITY: PHYSICAL HEALTH: ON AVERAGE, HOW MANY MINUTES DO YOU ENGAGE IN EXERCISE AT THIS LEVEL?: 0 MIN

## 2024-03-28 ASSESSMENT — SOCIAL DETERMINANTS OF HEALTH (SDOH): HOW OFTEN DO YOU GET TOGETHER WITH FRIENDS OR RELATIVES?: TWICE A WEEK

## 2024-03-28 NOTE — COMMUNITY RESOURCES LIST (ENGLISH)
March 28, 2024           YOUR PERSONALIZED LIST OF SERVICES & PROGRAMS           & RECREATION    Sports      of [employer] - Sports clubs and recreational activities  [address] [place] [address] (Distance: 1.0 miles)  Phone: [phone]  Website: [email or web address]  Language: English  Fee: Self pay      [employer] [facility] - Sports clubs and recreational activities - [employer] [facility] - [facility]  [address] [place] [address] (Distance: 1.3 miles)  Phone: [phone]  Language: English, Deni Diaz  Fee: Self pay      [facility] - Adult Enrichment  Phone: [phone]  Website: [email or web address]  Language: English  Hours: Mon 7:30 AM - 4:00 PM Tue 7:30 AM - 4:00 PM Wed 7:30 AM - 4:00 PM Thu 7:30 AM - 4:00 PM Fri 7:30 AM - 4:00 PM    Classes/Groups      of [facility] - Group fitness classes - [facility]  [address] [place] [address] (Distance: 1.1 miles)  Language: English  Fee: Free, Self pay, Sliding scale      of [place] - Yoga or Pilates classes - [facility]  [address] [place] [address] (Distance: 1.1 miles)  Language: English  Fee: Free, Self pay, Sliding scale      [facility] - Care Coordination (Healthcare only)  Phone: [phone]  Website: [email or web address]  Language: English, Vietnamese  Hours: Wed 9:00 AM - 11:30 AM Thu 1:00 PM - 4:00 PM, 5:30 PM - 7:00 PM               IMPORTANT NUMBERS & WEBSITES        Emergency Services  911  .   United Way  211 http://211unitedway.org  .   Poison Control  (858) 334-4969 http://[place]poison.org http://wis[place]poison.org  .     Suicide and Crisis Lifeline  988 http://988lifeline.org  .   Childhelp National Child Abuse Hotline  976.564.3240 http://Childhelphotline.org   .   National Sexual Assault  Hotline  (250) 849-5353 (HOPE) http://Procuricsn.BringMeThat   .     National Runaway Safeline  (724) 545-1205 (RUNAWAY) http://ImageVision.BringMeThat  .   Pregnancy & Postpartum Support  Call/text 826-361-2862  [place]: http://ppsupportmn.org  [place]: http://psichapters.com/wi  .   Substance Abuse National Helpline ([facility])  867-063-HELP (7222) http://Findtreatment.gov   .                DISCLAIMER: [employer] does not endorse any service providers mentioned in this resource list. [employer] does not guarantee that the services mentioned in this resource list will be available to you or will improve your health or wellness.    [facility]

## 2024-03-28 NOTE — PATIENT INSTRUCTIONS
Preventive Care Advice   This is general advice given by our system to help you stay healthy. However, your care team may have specific advice just for you. Please talk to your care team about your preventive care needs.  Nutrition  Eat 5 or more servings of fruits and vegetables each day.  Try wheat bread, brown rice and whole grain pasta (instead of white bread, rice, and pasta).  Get enough calcium and vitamin D. Check the label on foods and aim for 100% of the RDA (recommended daily allowance).  Lifestyle  Exercise at least 150 minutes each week   (30 minutes a day, 5 days a week).  Do muscle strengthening activities 2 days a week. These help control your weight and prevent disease.  No smoking.  Wear sunscreen to prevent skin cancer.  Have a dental exam and cleaning every 6 months.  Yearly exams  See your health care team every year to talk about:  Any changes in your health.  Any medicines your care team has prescribed.  Preventive care, family planning, and ways to prevent chronic diseases.  Shots (vaccines)   HPV shots (up to age 26), if you've never had them before.  Hepatitis B shots (up to age 59), if you've never had them before.  COVID-19 shot: Get this shot when it's due.  Flu shot: Get a flu shot every year.  Tetanus shot: Get a tetanus shot every 10 years.  Pneumococcal, hepatitis A, and RSV shots: Ask your care team if you need these based on your risk.  Shingles shot (for age 50 and up).  General health tests  Diabetes screening:  Starting at age 35, Get screened for diabetes at least every 3 years.  If you are younger than age 35, ask your care team if you should be screened for diabetes.  Cholesterol test: At age 39, start having a cholesterol test every 5 years, or more often if advised.  Bone density scan (DEXA): At age 50, ask your care team if you should have this scan for osteoporosis (brittle bones).  Hepatitis C: Get tested at least once in your life.  STIs (sexually transmitted  infections)  Before age 24: Ask your care team if you should be screened for STIs.  After age 24: Get screened for STIs if you're at risk. You are at risk for STIs (including HIV) if:  You are sexually active with more than one person.  You don't use condoms every time.  You or a partner was diagnosed with a sexually transmitted infection.  If you are at risk for HIV, ask about PrEP medicine to prevent HIV.  Get tested for HIV at least once in your life, whether you are at risk for HIV or not.  Cancer screening tests  Cervical cancer screening: If you have a cervix, begin getting regular cervical cancer screening tests at age 21. Most people who have regular screenings with normal results can stop after age 65. Talk about this with your provider.  Breast cancer scan (mammogram): If you've ever had breasts, begin having regular mammograms starting at age 40. This is a scan to check for breast cancer.  Colon cancer screening: It is important to start screening for colon cancer at age 45.  Have a colonoscopy test every 10 years (or more often if you're at risk) Or, ask your provider about stool tests like a FIT test every year or Cologuard test every 3 years.  To learn more about your testing options, visit: https://www.[employer]omeasure/534484.pdf.  For help making a decision, visit: https://bit.ly/au09827.  Prostate cancer screening test: If you have a prostate and are age 55 to 69, ask your provider if you would benefit from a yearly prostate cancer screening test.  Lung cancer screening: If you are a current or former smoker age 50 to 80, ask your care team if ongoing lung cancer screenings are right for you.  For informational purposes only. Not to replace the advice of your health care provider. Copyright   2023 [employer]. All rights reserved. Clinically reviewed by the [facility] Transitions Program. Checkd.In 875159 - REV 01/24.

## 2024-03-28 NOTE — PROGRESS NOTES
"Preventive Care Visit  [facility] LAYLA Daily MD, Family Medicine  Mar 28, 2024      Assessment & Plan     Routine general medical examination at a health care facility    Migraine without status migrainosus, not intractable, unspecified migraine type: History consistent with migraine headaches. No red flag symptoms. No auras. She is on progestin-only birth control with nexplanon. She has not yet tried NSAIDS- prescribed both NSAIDS and triptan to use as needed. Reviewed how to use. Counseled on headache prevention methods, including adequate hydration, sleep hygiene, stress management, caffeine.  - ibuprofen (ADVIL/MOTRIN) 600 MG tablet  Dispense: 50 tablet; Refill: 2  - SUMAtriptan (IMITREX) 25 MG tablet  Dispense: 20 tablet; Refill: 3    History of cholelithiasis: Seen on US in 2021- she is asymptomatic currently (no biliary colic) and benign abdominal exam. No post-prandial symptoms. Reviewed warning signs- I did offer to repeat US vs referral to general surgery but patient prefers to wait and see if symptoms recur.        BMI  Estimated body mass index is 28.45 kg/m  as calculated from the following:    Height as of this encounter: 1.57 m (5' 1.81\").    Weight as of this encounter: 70.1 kg (154 lb 9.6 oz).       Counseling  Appropriate preventive services were discussed with this patient, including applicable screening as appropriate for fall prevention, nutrition, physical activity, Tobacco-use cessation, weight loss and cognition.  Checklist reviewing preventive services available has been given to the patient.  Reviewed patient's diet, addressing concerns and/or questions.   The patient was instructed to see the dentist every 6 months.           Shaan Hammer is a 33 year old, presenting for the following:  Physical ( having headaches and dizziness. Concerns about kidney stone. )        3/28/2024    11:53 AM   Additional Questions   Roomed by sang maldonado   Accompanied by Self    "       HPI      Headaches- every 2 weeks  Start at back of head/occipital region  +nausea, no vomiting  +photophobia, +phonophobia  Has tried tylenol  Sleeping- resolves the headaches    Hx of cholelithiasis- seen on US 7/2021  She is inquiring whether these stones can get bigger and worried about it  No RUQ pain  Sometimes when she walks a long period of time she will be tender in RUQ  No post-prandial symptoms  Denies nausea, vomiting, fever          3/28/2024   General Health   How would you rate your overall physical health? (!) FAIR   Feel stress (tense, anxious, or unable to sleep) Not at all         3/28/2024   Nutrition   Three or more servings of calcium each day? (!) NO   Diet: Regular (no restrictions)   How many servings of fruit and vegetables per day? (!) 2-3   How many sweetened beverages each day? 0-1         3/28/2024   Exercise   Days per week of moderate/strenous exercise 0 days   Average minutes spent exercising at this level 0 min   (!) EXERCISE CONCERN      3/28/2024   Social Factors   Frequency of gathering with friends or relatives Twice a week   Worry food won't last until get money to buy more No   Food not last or not have enough money for food? No   Do you have housing?  Yes   Are you worried about losing your housing? No   Lack of transportation? No   Unable to get utilities (heat,electricity)? No         3/28/2024   Dental   Dentist two times every year? (!) NO         3/28/2024   TB Screening   Were you born outside of the [place]? Yes         Today's PHQ-2 Score:       3/28/2024    11:53 AM   PHQ-2 ( 1999 [employer])   Q1: Little interest or pleasure in doing things 1   Q2: Feeling down, depressed or hopeless 1   PHQ-2 Score 2   Q1: Little interest or pleasure in doing things Several days   Q2: Feeling down, depressed or hopeless Several days   PHQ-2 Score 2           3/28/2024   Substance Use   Alcohol more than 3/day or more than 7/wk No   Do you use any other substances recreationally? No  "    Social History     Tobacco Use    Smoking status: Never     Passive exposure: Never    Smokeless tobacco: Never    Tobacco comments:     no passive exposure   Vaping Use    Vaping Use: Never used   Substance Use Topics    Alcohol use: No    Drug use: No                  3/28/2024   STI Screening   New sexual partner(s) since last STI/HIV test? No           Latest Ref Rng & Units 8/17/2021     3:23 PM 3/27/2018     4:55 PM 4/2/2015    10:30 AM   PAP / HPV   PAP  Negative for Intraepithelial Lesion or Malignancy (NILM)  Negative for squamous intraepithelial lesion or malignancy  Electronically signed by Feli Fierro CT (ASCP) on 3/28/2018 at  2:45 PM    Negative for squamous intraepithelial lesion or malignancy  Electronically signed by Adriana Sanchez CT (ASCP) on 4/9/2015 at 11:57 AM      HPV 16 DNA Negative Negative      HPV 18 DNA Negative Negative      Other HR HPV Negative Negative              3/28/2024   Contraception/Family Planning   Questions about contraception or family planning No        Reviewed and updated as needed this visit by Provider                       Objective    Exam  BP 98/63 (BP Location: Right arm, Patient Position: Sitting, Cuff Size: Adult Regular)   Pulse 89   Temp 99.1  F (37.3  C) (Oral)   Resp 20   Ht 1.57 m (5' 1.81\")   Wt 70.1 kg (154 lb 9.6 oz)   LMP  (LMP Unknown)   SpO2 98%   Breastfeeding Yes   BMI 28.45 kg/m     Estimated body mass index is 28.45 kg/m  as calculated from the following:    Height as of this encounter: 1.57 m (5' 1.81\").    Weight as of this encounter: 70.1 kg (154 lb 9.6 oz).    Physical Exam  General: Alert, NAD  Eyes: PERRL, EOMI, sclera normal.  HENT: Normocephalic, no pharyngeal erythema, MMM.  Neck: Supple, no adenopathy.  Heart: Normal S1 and S2, regular rhythm. No murmurs, gallops, rubs.  Lungs: CTA bilaterally, no wheezes, no crackles, no rhonchi.  Abdomen: Soft, non-tender, non-distended, BS+.   Skin: Warm and well " perfused, no rashes noted.  Psych: Normal mood and affect.          Signed Electronically by: Lucia Daily MD

## 2024-04-24 NOTE — PLAN OF CARE
Patient assessed with assistance from  a telephone . Patient is managing pain with occasional Ibuprofen and Tylenol. Patient was educated on the use of a breast pump.    Postpartum assessment WNL.   Tdap and flu up to date.   Mood assessment and birth certificate/ROP completed.     Problem: Plan of Care - These are the overarching goals to be used throughout the patient stay.    Goal: Optimal Comfort and Wellbeing  Outcome: Progressing  Intervention: Provide Person-Centered Care  Recent Flowsheet Documentation  Taken 3/2/2023 2037 by Reyna Morgan, RN  Trust Relationship/Rapport:    care explained    choices provided    questions answered    questions encouraged    thoughts/feelings acknowledged      [Well Developed] : well developed [Well Nourished] : well nourished [No Acute Distress] : no acute distress [Normal Venous Pressure] : normal venous pressure [No Carotid Bruit] : no carotid bruit [Normal S1, S2] : normal S1, S2 [No Murmur] : no murmur [No Rub] : no rub [No Gallop] : no gallop [Clear Lung Fields] : clear lung fields [Good Air Entry] : good air entry [No Respiratory Distress] : no respiratory distress  [Soft] : abdomen soft [Non Tender] : non-tender [No Masses/organomegaly] : no masses/organomegaly [Normal Bowel Sounds] : normal bowel sounds [Normal Gait] : normal gait [No Edema] : no edema [No Cyanosis] : no cyanosis [No Clubbing] : no clubbing [No Varicosities] : no varicosities [No Rash] : no rash [No Skin Lesions] : no skin lesions [Moves all extremities] : moves all extremities [No Focal Deficits] : no focal deficits [Normal Speech] : normal speech [Alert and Oriented] : alert and oriented [Normal memory] : normal memory [General Appearance - Well Developed] : well developed [Normal Appearance] : normal appearance [Well Groomed] : well groomed [General Appearance - Well Nourished] : well nourished [No Deformities] : no deformities [General Appearance - In No Acute Distress] : no acute distress [Normal Conjunctiva] : the conjunctiva exhibited no abnormalities [Eyelids - No Xanthelasma] : the eyelids demonstrated no xanthelasmas [Normal Oral Mucosa] : normal oral mucosa [No Oral Pallor] : no oral pallor [No Oral Cyanosis] : no oral cyanosis [Normal Jugular Venous A Waves Present] : normal jugular venous A waves present [Normal Jugular Venous V Waves Present] : normal jugular venous V waves present [No Jugular Venous Romo A Waves] : no jugular venous romo A waves [Respiration, Rhythm And Depth] : normal respiratory rhythm and effort [Exaggerated Use Of Accessory Muscles For Inspiration] : no accessory muscle use [Auscultation Breath Sounds / Voice Sounds] : lungs were clear to auscultation bilaterally [Heart Rate And Rhythm] : heart rate and rhythm were normal [Heart Sounds] : normal S1 and S2 [Murmurs] : no murmurs present [Abdomen Soft] : soft [Abdomen Tenderness] : non-tender [Abdomen Mass (___ Cm)] : no abdominal mass palpated [Abnormal Walk] : normal gait [Gait - Sufficient For Exercise Testing] : the gait was sufficient for exercise testing [Nail Clubbing] : no clubbing of the fingernails [Cyanosis, Localized] : no localized cyanosis [Petechial Hemorrhages (___cm)] : no petechial hemorrhages [] : no ischemic changes [Oriented To Time, Place, And Person] : oriented to person, place, and time [Affect] : the affect was normal [Mood] : the mood was normal [No Anxiety] : not feeling anxious

## 2024-04-29 ENCOUNTER — ANESTHESIA EVENT (OUTPATIENT)
Dept: SURGERY | Facility: HOSPITAL | Age: 34
End: 2024-04-29
Payer: COMMERCIAL

## 2024-04-29 ENCOUNTER — HOSPITAL ENCOUNTER (OUTPATIENT)
Facility: HOSPITAL | Age: 34
Setting detail: OBSERVATION
Discharge: HOME | End: 2024-04-29
Attending: EMERGENCY MEDICINE | Admitting: SURGERY
Payer: COMMERCIAL

## 2024-04-29 ENCOUNTER — ANESTHESIA (OUTPATIENT)
Dept: SURGERY | Facility: HOSPITAL | Age: 34
End: 2024-04-29
Payer: COMMERCIAL

## 2024-04-29 ENCOUNTER — APPOINTMENT (OUTPATIENT)
Dept: ULTRASOUND IMAGING | Facility: HOSPITAL | Age: 34
End: 2024-04-29
Attending: EMERGENCY MEDICINE
Payer: COMMERCIAL

## 2024-04-29 ENCOUNTER — HOSPITAL ENCOUNTER (OUTPATIENT)
Facility: HOSPITAL | Age: 34
End: 2024-04-29
Attending: SURGERY | Admitting: SURGERY
Payer: COMMERCIAL

## 2024-04-29 VITALS
TEMPERATURE: 97.5 F | RESPIRATION RATE: 16 BRPM | DIASTOLIC BLOOD PRESSURE: 75 MMHG | HEART RATE: 95 BPM | SYSTOLIC BLOOD PRESSURE: 117 MMHG | OXYGEN SATURATION: 98 %

## 2024-04-29 DIAGNOSIS — K81.9 CHOLECYSTITIS: ICD-10-CM

## 2024-04-29 DIAGNOSIS — K80.20 CALCULUS OF GALLBLADDER WITHOUT CHOLECYSTITIS WITHOUT OBSTRUCTION: Primary | ICD-10-CM

## 2024-04-29 LAB
ALBUMIN SERPL BCG-MCNC: 4.3 G/DL (ref 3.5–5.2)
ALBUMIN UR-MCNC: NEGATIVE MG/DL
ALP SERPL-CCNC: 118 U/L (ref 40–150)
ALT SERPL W P-5'-P-CCNC: 20 U/L (ref 0–50)
ANION GAP SERPL CALCULATED.3IONS-SCNC: 11 MMOL/L (ref 7–15)
APPEARANCE UR: CLEAR
AST SERPL W P-5'-P-CCNC: 19 U/L (ref 0–45)
BASOPHILS # BLD AUTO: 0 10E3/UL (ref 0–0.2)
BASOPHILS NFR BLD AUTO: 0 %
BILIRUB DIRECT SERPL-MCNC: <0.2 MG/DL (ref 0–0.3)
BILIRUB SERPL-MCNC: 0.2 MG/DL
BILIRUB UR QL STRIP: NEGATIVE
BUN SERPL-MCNC: 12.7 MG/DL (ref 6–20)
CALCIUM SERPL-MCNC: 9.6 MG/DL (ref 8.6–10)
CHLORIDE SERPL-SCNC: 104 MMOL/L (ref 98–107)
COLOR UR AUTO: ABNORMAL
CREAT SERPL-MCNC: 0.53 MG/DL (ref 0.51–0.95)
DEPRECATED HCO3 PLAS-SCNC: 26 MMOL/L (ref 22–29)
EGFRCR SERPLBLD CKD-EPI 2021: >90 ML/MIN/1.73M2
EOSINOPHIL # BLD AUTO: 0.1 10E3/UL (ref 0–0.7)
EOSINOPHIL NFR BLD AUTO: 1 %
ERYTHROCYTE [DISTWIDTH] IN BLOOD BY AUTOMATED COUNT: 13.5 % (ref 10–15)
GLUCOSE SERPL-MCNC: 101 MG/DL (ref 70–99)
GLUCOSE UR STRIP-MCNC: NEGATIVE MG/DL
HCG UR QL: NEGATIVE
HCT VFR BLD AUTO: 39 % (ref 35–47)
HGB BLD-MCNC: 12.8 G/DL (ref 11.7–15.7)
HGB UR QL STRIP: NEGATIVE
HOLD SPECIMEN: NORMAL
IMM GRANULOCYTES # BLD: 0 10E3/UL
IMM GRANULOCYTES NFR BLD: 0 %
KETONES UR STRIP-MCNC: NEGATIVE MG/DL
LEUKOCYTE ESTERASE UR QL STRIP: ABNORMAL
LIPASE SERPL-CCNC: 23 U/L (ref 13–60)
LYMPHOCYTES # BLD AUTO: 1.6 10E3/UL (ref 0.8–5.3)
LYMPHOCYTES NFR BLD AUTO: 17 %
MCH RBC QN AUTO: 27.8 PG (ref 26.5–33)
MCHC RBC AUTO-ENTMCNC: 32.8 G/DL (ref 31.5–36.5)
MCV RBC AUTO: 85 FL (ref 78–100)
MONOCYTES # BLD AUTO: 0.5 10E3/UL (ref 0–1.3)
MONOCYTES NFR BLD AUTO: 5 %
MUCOUS THREADS #/AREA URNS LPF: PRESENT /LPF
NEUTROPHILS # BLD AUTO: 7.2 10E3/UL (ref 1.6–8.3)
NEUTROPHILS NFR BLD AUTO: 77 %
NITRATE UR QL: NEGATIVE
NRBC # BLD AUTO: 0 10E3/UL
NRBC BLD AUTO-RTO: 0 /100
PH UR STRIP: 7 [PH] (ref 5–7)
PLATELET # BLD AUTO: 327 10E3/UL (ref 150–450)
POTASSIUM SERPL-SCNC: 3.9 MMOL/L (ref 3.4–5.3)
PROT SERPL-MCNC: 7.8 G/DL (ref 6.4–8.3)
RBC # BLD AUTO: 4.6 10E6/UL (ref 3.8–5.2)
RBC URINE: 1 /HPF
SODIUM SERPL-SCNC: 141 MMOL/L (ref 135–145)
SP GR UR STRIP: 1.02 (ref 1–1.03)
SQUAMOUS EPITHELIAL: <1 /HPF
TRANSITIONAL EPI: <1 /HPF
UROBILINOGEN UR STRIP-MCNC: <2 MG/DL
WBC # BLD AUTO: 9.4 10E3/UL (ref 4–11)
WBC URINE: 4 /HPF

## 2024-04-29 PROCEDURE — 258N000003 HC RX IP 258 OP 636: Performed by: ANESTHESIOLOGY

## 2024-04-29 PROCEDURE — 76705 ECHO EXAM OF ABDOMEN: CPT

## 2024-04-29 PROCEDURE — 250N000011 HC RX IP 250 OP 636: Performed by: ANESTHESIOLOGY

## 2024-04-29 PROCEDURE — 250N000013 HC RX MED GY IP 250 OP 250 PS 637: Performed by: SURGERY

## 2024-04-29 PROCEDURE — 258N000003 HC RX IP 258 OP 636: Performed by: EMERGENCY MEDICINE

## 2024-04-29 PROCEDURE — 96375 TX/PRO/DX INJ NEW DRUG ADDON: CPT

## 2024-04-29 PROCEDURE — 250N000009 HC RX 250: Performed by: ANESTHESIOLOGY

## 2024-04-29 PROCEDURE — 710N000012 HC RECOVERY PHASE 2, PER MINUTE: Performed by: SURGERY

## 2024-04-29 PROCEDURE — 250N000011 HC RX IP 250 OP 636: Performed by: SURGERY

## 2024-04-29 PROCEDURE — 999N000141 HC STATISTIC PRE-PROCEDURE NURSING ASSESSMENT: Performed by: SURGERY

## 2024-04-29 PROCEDURE — 96361 HYDRATE IV INFUSION ADD-ON: CPT | Mod: 59

## 2024-04-29 PROCEDURE — 83690 ASSAY OF LIPASE: CPT | Performed by: EMERGENCY MEDICINE

## 2024-04-29 PROCEDURE — 47562 LAPAROSCOPIC CHOLECYSTECTOMY: CPT | Performed by: SURGERY

## 2024-04-29 PROCEDURE — 710N000009 HC RECOVERY PHASE 1, LEVEL 1, PER MIN: Performed by: SURGERY

## 2024-04-29 PROCEDURE — 250N000025 HC SEVOFLURANE, PER MIN: Performed by: SURGERY

## 2024-04-29 PROCEDURE — 272N000001 HC OR GENERAL SUPPLY STERILE: Performed by: SURGERY

## 2024-04-29 PROCEDURE — 250N000013 HC RX MED GY IP 250 OP 250 PS 637: Performed by: ANESTHESIOLOGY

## 2024-04-29 PROCEDURE — 370N000017 HC ANESTHESIA TECHNICAL FEE, PER MIN: Performed by: SURGERY

## 2024-04-29 PROCEDURE — 81001 URINALYSIS AUTO W/SCOPE: CPT | Performed by: EMERGENCY MEDICINE

## 2024-04-29 PROCEDURE — 85025 COMPLETE CBC W/AUTO DIFF WBC: CPT | Performed by: EMERGENCY MEDICINE

## 2024-04-29 PROCEDURE — G0378 HOSPITAL OBSERVATION PER HR: HCPCS

## 2024-04-29 PROCEDURE — 250N000011 HC RX IP 250 OP 636: Performed by: EMERGENCY MEDICINE

## 2024-04-29 PROCEDURE — 82374 ASSAY BLOOD CARBON DIOXIDE: CPT | Performed by: EMERGENCY MEDICINE

## 2024-04-29 PROCEDURE — 88304 TISSUE EXAM BY PATHOLOGIST: CPT | Mod: 26 | Performed by: PATHOLOGY

## 2024-04-29 PROCEDURE — 96365 THER/PROPH/DIAG IV INF INIT: CPT | Mod: 59

## 2024-04-29 PROCEDURE — 360N000076 HC SURGERY LEVEL 3, PER MIN: Performed by: SURGERY

## 2024-04-29 PROCEDURE — 250N000009 HC RX 250: Performed by: SURGERY

## 2024-04-29 PROCEDURE — 36415 COLL VENOUS BLD VENIPUNCTURE: CPT | Performed by: STUDENT IN AN ORGANIZED HEALTH CARE EDUCATION/TRAINING PROGRAM

## 2024-04-29 PROCEDURE — 88304 TISSUE EXAM BY PATHOLOGIST: CPT | Mod: TC | Performed by: SURGERY

## 2024-04-29 PROCEDURE — 81025 URINE PREGNANCY TEST: CPT | Performed by: SURGERY

## 2024-04-29 PROCEDURE — 99285 EMERGENCY DEPT VISIT HI MDM: CPT | Mod: 25

## 2024-04-29 PROCEDURE — 80076 HEPATIC FUNCTION PANEL: CPT | Performed by: EMERGENCY MEDICINE

## 2024-04-29 PROCEDURE — 99204 OFFICE O/P NEW MOD 45 MIN: CPT | Mod: 57 | Performed by: SURGERY

## 2024-04-29 RX ORDER — ONDANSETRON 4 MG/1
4 TABLET, ORALLY DISINTEGRATING ORAL EVERY 30 MIN PRN
Status: DISCONTINUED | OUTPATIENT
Start: 2024-04-29 | End: 2024-04-29 | Stop reason: HOSPADM

## 2024-04-29 RX ORDER — NALOXONE HYDROCHLORIDE 0.4 MG/ML
0.4 INJECTION, SOLUTION INTRAMUSCULAR; INTRAVENOUS; SUBCUTANEOUS
Status: DISCONTINUED | OUTPATIENT
Start: 2024-04-29 | End: 2024-04-29 | Stop reason: HOSPADM

## 2024-04-29 RX ORDER — ONDANSETRON 2 MG/ML
4 INJECTION INTRAMUSCULAR; INTRAVENOUS EVERY 30 MIN PRN
Status: DISCONTINUED | OUTPATIENT
Start: 2024-04-29 | End: 2024-04-29 | Stop reason: HOSPADM

## 2024-04-29 RX ORDER — ONDANSETRON 2 MG/ML
INJECTION INTRAMUSCULAR; INTRAVENOUS PRN
Status: DISCONTINUED | OUTPATIENT
Start: 2024-04-29 | End: 2024-04-29

## 2024-04-29 RX ORDER — LIDOCAINE 40 MG/G
CREAM TOPICAL
Status: DISCONTINUED | OUTPATIENT
Start: 2024-04-29 | End: 2024-04-29 | Stop reason: HOSPADM

## 2024-04-29 RX ORDER — HYDROMORPHONE HYDROCHLORIDE 1 MG/ML
0.4 INJECTION, SOLUTION INTRAMUSCULAR; INTRAVENOUS; SUBCUTANEOUS EVERY 5 MIN PRN
Status: DISCONTINUED | OUTPATIENT
Start: 2024-04-29 | End: 2024-04-29 | Stop reason: HOSPADM

## 2024-04-29 RX ORDER — NALOXONE HYDROCHLORIDE 0.4 MG/ML
0.1 INJECTION, SOLUTION INTRAMUSCULAR; INTRAVENOUS; SUBCUTANEOUS
Status: DISCONTINUED | OUTPATIENT
Start: 2024-04-29 | End: 2024-04-29 | Stop reason: HOSPADM

## 2024-04-29 RX ORDER — AMOXICILLIN 250 MG
1 CAPSULE ORAL 2 TIMES DAILY PRN
Status: DISCONTINUED | OUTPATIENT
Start: 2024-04-29 | End: 2024-04-29 | Stop reason: HOSPADM

## 2024-04-29 RX ORDER — HYDROMORPHONE HCL IN WATER/PF 6 MG/30 ML
0.2 PATIENT CONTROLLED ANALGESIA SYRINGE INTRAVENOUS
Status: DISCONTINUED | OUTPATIENT
Start: 2024-04-29 | End: 2024-04-29 | Stop reason: HOSPADM

## 2024-04-29 RX ORDER — SODIUM CHLORIDE, SODIUM LACTATE, POTASSIUM CHLORIDE, CALCIUM CHLORIDE 600; 310; 30; 20 MG/100ML; MG/100ML; MG/100ML; MG/100ML
INJECTION, SOLUTION INTRAVENOUS CONTINUOUS
Status: DISCONTINUED | OUTPATIENT
Start: 2024-04-29 | End: 2024-04-29 | Stop reason: HOSPADM

## 2024-04-29 RX ORDER — CEFAZOLIN SODIUM/WATER 2 G/20 ML
2 SYRINGE (ML) INTRAVENOUS SEE ADMIN INSTRUCTIONS
Status: DISCONTINUED | OUTPATIENT
Start: 2024-04-29 | End: 2024-04-29 | Stop reason: HOSPADM

## 2024-04-29 RX ORDER — PROPOFOL 10 MG/ML
INJECTION, EMULSION INTRAVENOUS CONTINUOUS PRN
Status: DISCONTINUED | OUTPATIENT
Start: 2024-04-29 | End: 2024-04-29

## 2024-04-29 RX ORDER — HYDROMORPHONE HYDROCHLORIDE 1 MG/ML
0.2 INJECTION, SOLUTION INTRAMUSCULAR; INTRAVENOUS; SUBCUTANEOUS EVERY 5 MIN PRN
Status: DISCONTINUED | OUTPATIENT
Start: 2024-04-29 | End: 2024-04-29 | Stop reason: HOSPADM

## 2024-04-29 RX ORDER — IBUPROFEN 600 MG/1
600 TABLET, FILM COATED ORAL EVERY 6 HOURS PRN
Qty: 30 TABLET | Refills: 0 | Status: SHIPPED | OUTPATIENT
Start: 2024-04-29

## 2024-04-29 RX ORDER — HYDROCODONE BITARTRATE AND ACETAMINOPHEN 5; 325 MG/1; MG/1
1-2 TABLET ORAL EVERY 4 HOURS PRN
Status: DISCONTINUED | OUTPATIENT
Start: 2024-04-29 | End: 2024-04-29 | Stop reason: HOSPADM

## 2024-04-29 RX ORDER — ACETAMINOPHEN 325 MG/1
975 TABLET ORAL ONCE
Status: DISCONTINUED | OUTPATIENT
Start: 2024-04-29 | End: 2024-04-29

## 2024-04-29 RX ORDER — OXYCODONE HYDROCHLORIDE 5 MG/1
5 TABLET ORAL EVERY 4 HOURS PRN
Status: DISCONTINUED | OUTPATIENT
Start: 2024-04-29 | End: 2024-04-29 | Stop reason: HOSPADM

## 2024-04-29 RX ORDER — DEXAMETHASONE SODIUM PHOSPHATE 10 MG/ML
INJECTION, SOLUTION INTRAMUSCULAR; INTRAVENOUS PRN
Status: DISCONTINUED | OUTPATIENT
Start: 2024-04-29 | End: 2024-04-29

## 2024-04-29 RX ORDER — ONDANSETRON 2 MG/ML
4 INJECTION INTRAMUSCULAR; INTRAVENOUS ONCE
Status: COMPLETED | OUTPATIENT
Start: 2024-04-29 | End: 2024-04-29

## 2024-04-29 RX ORDER — BUPIVACAINE HYDROCHLORIDE AND EPINEPHRINE 2.5; 5 MG/ML; UG/ML
INJECTION, SOLUTION INFILTRATION; PERINEURAL
Status: DISCONTINUED
Start: 2024-04-29 | End: 2024-04-29 | Stop reason: WASHOUT

## 2024-04-29 RX ORDER — KETOROLAC TROMETHAMINE 30 MG/ML
INJECTION, SOLUTION INTRAMUSCULAR; INTRAVENOUS PRN
Status: DISCONTINUED | OUTPATIENT
Start: 2024-04-29 | End: 2024-04-29

## 2024-04-29 RX ORDER — ACETAMINOPHEN 325 MG/1
975 TABLET ORAL ONCE
Status: COMPLETED | OUTPATIENT
Start: 2024-04-29 | End: 2024-04-29

## 2024-04-29 RX ORDER — NALOXONE HYDROCHLORIDE 0.4 MG/ML
0.2 INJECTION, SOLUTION INTRAMUSCULAR; INTRAVENOUS; SUBCUTANEOUS
Status: DISCONTINUED | OUTPATIENT
Start: 2024-04-29 | End: 2024-04-29 | Stop reason: HOSPADM

## 2024-04-29 RX ORDER — MAGNESIUM SULFATE 4 G/50ML
4 INJECTION INTRAVENOUS ONCE
Status: COMPLETED | OUTPATIENT
Start: 2024-04-29 | End: 2024-04-29

## 2024-04-29 RX ORDER — ACETAMINOPHEN 325 MG/1
650 TABLET ORAL EVERY 4 HOURS PRN
Status: DISCONTINUED | OUTPATIENT
Start: 2024-04-29 | End: 2024-04-29 | Stop reason: HOSPADM

## 2024-04-29 RX ORDER — LIDOCAINE HYDROCHLORIDE 10 MG/ML
INJECTION, SOLUTION INFILTRATION; PERINEURAL PRN
Status: DISCONTINUED | OUTPATIENT
Start: 2024-04-29 | End: 2024-04-29

## 2024-04-29 RX ORDER — ACETAMINOPHEN 650 MG/1
650 SUPPOSITORY RECTAL EVERY 4 HOURS PRN
Status: DISCONTINUED | OUTPATIENT
Start: 2024-04-29 | End: 2024-04-29 | Stop reason: HOSPADM

## 2024-04-29 RX ORDER — PROPOFOL 10 MG/ML
INJECTION, EMULSION INTRAVENOUS PRN
Status: DISCONTINUED | OUTPATIENT
Start: 2024-04-29 | End: 2024-04-29

## 2024-04-29 RX ORDER — HYDROCODONE BITARTRATE AND ACETAMINOPHEN 5; 325 MG/1; MG/1
1-2 TABLET ORAL EVERY 4 HOURS PRN
Qty: 10 TABLET | Refills: 0 | Status: SHIPPED | OUTPATIENT
Start: 2024-04-29

## 2024-04-29 RX ORDER — ONDANSETRON 2 MG/ML
4 INJECTION INTRAMUSCULAR; INTRAVENOUS EVERY 6 HOURS PRN
Status: DISCONTINUED | OUTPATIENT
Start: 2024-04-29 | End: 2024-04-29 | Stop reason: HOSPADM

## 2024-04-29 RX ORDER — FENTANYL CITRATE 50 UG/ML
50 INJECTION, SOLUTION INTRAMUSCULAR; INTRAVENOUS EVERY 5 MIN PRN
Status: DISCONTINUED | OUTPATIENT
Start: 2024-04-29 | End: 2024-04-29 | Stop reason: HOSPADM

## 2024-04-29 RX ORDER — NALOXONE HYDROCHLORIDE 1 MG/ML
0.1 INJECTION INTRAMUSCULAR; INTRAVENOUS; SUBCUTANEOUS
Status: DISCONTINUED | OUTPATIENT
Start: 2024-04-29 | End: 2024-04-29 | Stop reason: HOSPADM

## 2024-04-29 RX ORDER — ACETAMINOPHEN 325 MG/1
650 TABLET ORAL EVERY 4 HOURS PRN
Qty: 50 TABLET | Refills: 0 | Status: SHIPPED | OUTPATIENT
Start: 2024-04-29

## 2024-04-29 RX ORDER — BUPIVACAINE HYDROCHLORIDE AND EPINEPHRINE 2.5; 5 MG/ML; UG/ML
INJECTION, SOLUTION EPIDURAL; INFILTRATION; INTRACAUDAL; PERINEURAL PRN
Status: DISCONTINUED | OUTPATIENT
Start: 2024-04-29 | End: 2024-04-29 | Stop reason: HOSPADM

## 2024-04-29 RX ORDER — ACETAMINOPHEN 325 MG/1
650 TABLET ORAL
Status: DISCONTINUED | OUTPATIENT
Start: 2024-04-29 | End: 2024-04-29 | Stop reason: HOSPADM

## 2024-04-29 RX ORDER — ONDANSETRON 4 MG/1
4 TABLET, ORALLY DISINTEGRATING ORAL
Status: COMPLETED | OUTPATIENT
Start: 2024-04-29 | End: 2024-04-29

## 2024-04-29 RX ORDER — MORPHINE SULFATE 4 MG/ML
4 INJECTION, SOLUTION INTRAMUSCULAR; INTRAVENOUS ONCE
Status: COMPLETED | OUTPATIENT
Start: 2024-04-29 | End: 2024-04-29

## 2024-04-29 RX ORDER — AMOXICILLIN 250 MG
2 CAPSULE ORAL 2 TIMES DAILY PRN
Status: DISCONTINUED | OUTPATIENT
Start: 2024-04-29 | End: 2024-04-29 | Stop reason: HOSPADM

## 2024-04-29 RX ORDER — PIPERACILLIN SODIUM, TAZOBACTAM SODIUM 3; .375 G/15ML; G/15ML
3.38 INJECTION, POWDER, LYOPHILIZED, FOR SOLUTION INTRAVENOUS ONCE
Status: COMPLETED | OUTPATIENT
Start: 2024-04-29 | End: 2024-04-29

## 2024-04-29 RX ORDER — FENTANYL CITRATE 50 UG/ML
25 INJECTION, SOLUTION INTRAMUSCULAR; INTRAVENOUS EVERY 5 MIN PRN
Status: DISCONTINUED | OUTPATIENT
Start: 2024-04-29 | End: 2024-04-29 | Stop reason: HOSPADM

## 2024-04-29 RX ORDER — ONDANSETRON 4 MG/1
4 TABLET, ORALLY DISINTEGRATING ORAL EVERY 6 HOURS PRN
Status: DISCONTINUED | OUTPATIENT
Start: 2024-04-29 | End: 2024-04-29 | Stop reason: HOSPADM

## 2024-04-29 RX ORDER — OXYCODONE HYDROCHLORIDE 5 MG/1
10 TABLET ORAL
Status: DISCONTINUED | OUTPATIENT
Start: 2024-04-29 | End: 2024-04-29 | Stop reason: HOSPADM

## 2024-04-29 RX ORDER — CEFAZOLIN SODIUM/WATER 2 G/20 ML
2 SYRINGE (ML) INTRAVENOUS
Status: COMPLETED | OUTPATIENT
Start: 2024-04-29 | End: 2024-04-29

## 2024-04-29 RX ORDER — FENTANYL CITRATE 50 UG/ML
INJECTION, SOLUTION INTRAMUSCULAR; INTRAVENOUS PRN
Status: DISCONTINUED | OUTPATIENT
Start: 2024-04-29 | End: 2024-04-29

## 2024-04-29 RX ORDER — HYDROMORPHONE HCL IN WATER/PF 6 MG/30 ML
0.4 PATIENT CONTROLLED ANALGESIA SYRINGE INTRAVENOUS
Status: DISCONTINUED | OUTPATIENT
Start: 2024-04-29 | End: 2024-04-29 | Stop reason: HOSPADM

## 2024-04-29 RX ORDER — OXYCODONE HYDROCHLORIDE 5 MG/1
5 TABLET ORAL
Status: COMPLETED | OUTPATIENT
Start: 2024-04-29 | End: 2024-04-29

## 2024-04-29 RX ADMIN — MAGNESIUM SULFATE HEPTAHYDRATE 4 G: 80 INJECTION, SOLUTION INTRAVENOUS at 16:31

## 2024-04-29 RX ADMIN — SODIUM CHLORIDE, POTASSIUM CHLORIDE, SODIUM LACTATE AND CALCIUM CHLORIDE: 600; 310; 30; 20 INJECTION, SOLUTION INTRAVENOUS at 16:40

## 2024-04-29 RX ADMIN — SUGAMMADEX 200 MG: 100 INJECTION, SOLUTION INTRAVENOUS at 18:01

## 2024-04-29 RX ADMIN — FENTANYL CITRATE 50 MCG: 50 INJECTION, SOLUTION INTRAMUSCULAR; INTRAVENOUS at 18:24

## 2024-04-29 RX ADMIN — SODIUM CHLORIDE, POTASSIUM CHLORIDE, SODIUM LACTATE AND CALCIUM CHLORIDE 100 ML/HR: 600; 310; 30; 20 INJECTION, SOLUTION INTRAVENOUS at 18:53

## 2024-04-29 RX ADMIN — PROPOFOL 100 MCG/KG/MIN: 10 INJECTION, EMULSION INTRAVENOUS at 16:56

## 2024-04-29 RX ADMIN — FENTANYL CITRATE 50 MCG: 50 INJECTION, SOLUTION INTRAMUSCULAR; INTRAVENOUS at 18:19

## 2024-04-29 RX ADMIN — MORPHINE SULFATE 4 MG: 4 INJECTION, SOLUTION INTRAMUSCULAR; INTRAVENOUS at 09:00

## 2024-04-29 RX ADMIN — KETOROLAC TROMETHAMINE 30 MG: 30 INJECTION, SOLUTION INTRAMUSCULAR at 17:55

## 2024-04-29 RX ADMIN — FENTANYL CITRATE 100 MCG: 50 INJECTION INTRAMUSCULAR; INTRAVENOUS at 16:56

## 2024-04-29 RX ADMIN — ONDANSETRON 4 MG: 2 INJECTION INTRAMUSCULAR; INTRAVENOUS at 09:00

## 2024-04-29 RX ADMIN — Medication 2 G: at 16:50

## 2024-04-29 RX ADMIN — ONDANSETRON 4 MG: 4 TABLET, ORALLY DISINTEGRATING ORAL at 19:07

## 2024-04-29 RX ADMIN — ONDANSETRON 4 MG: 2 INJECTION INTRAMUSCULAR; INTRAVENOUS at 17:50

## 2024-04-29 RX ADMIN — LIDOCAINE HYDROCHLORIDE 5 ML: 10 INJECTION, SOLUTION INFILTRATION; PERINEURAL at 16:56

## 2024-04-29 RX ADMIN — PROPOFOL 150 MG: 10 INJECTION, EMULSION INTRAVENOUS at 16:56

## 2024-04-29 RX ADMIN — ONDANSETRON 4 MG: 4 TABLET, ORALLY DISINTEGRATING ORAL at 19:10

## 2024-04-29 RX ADMIN — SODIUM CHLORIDE 1000 ML: 9 INJECTION, SOLUTION INTRAVENOUS at 09:01

## 2024-04-29 RX ADMIN — PROCHLORPERAZINE EDISYLATE 5 MG: 5 INJECTION INTRAMUSCULAR; INTRAVENOUS at 20:05

## 2024-04-29 RX ADMIN — HYDROMORPHONE HYDROCHLORIDE 0.4 MG: 1 INJECTION, SOLUTION INTRAMUSCULAR; INTRAVENOUS; SUBCUTANEOUS at 18:38

## 2024-04-29 RX ADMIN — ROCURONIUM BROMIDE 50 MG: 50 INJECTION, SOLUTION INTRAVENOUS at 16:56

## 2024-04-29 RX ADMIN — ACETAMINOPHEN 975 MG: 325 TABLET ORAL at 16:32

## 2024-04-29 RX ADMIN — DEXAMETHASONE SODIUM PHOSPHATE 5 MG: 10 INJECTION, SOLUTION INTRAMUSCULAR; INTRAVENOUS at 17:00

## 2024-04-29 RX ADMIN — OXYCODONE HYDROCHLORIDE 5 MG: 5 TABLET ORAL at 19:25

## 2024-04-29 RX ADMIN — PIPERACILLIN AND TAZOBACTAM 3.38 G: 3; .375 INJECTION, POWDER, FOR SOLUTION INTRAVENOUS at 11:17

## 2024-04-29 ASSESSMENT — ACTIVITIES OF DAILY LIVING (ADL)
ADLS_ACUITY_SCORE: 35
ADLS_ACUITY_SCORE: 36
ADLS_ACUITY_SCORE: 35

## 2024-04-29 ASSESSMENT — COLUMBIA-SUICIDE SEVERITY RATING SCALE - C-SSRS
2. HAVE YOU ACTUALLY HAD ANY THOUGHTS OF KILLING YOURSELF IN THE PAST MONTH?: NO
6. HAVE YOU EVER DONE ANYTHING, STARTED TO DO ANYTHING, OR PREPARED TO DO ANYTHING TO END YOUR LIFE?: NO
1. IN THE PAST MONTH, HAVE YOU WISHED YOU WERE DEAD OR WISHED YOU COULD GO TO SLEEP AND NOT WAKE UP?: NO

## 2024-04-29 NOTE — ED NOTES
Bed: [place] [address]  Expected date:   Expected time:   Means of arrival:   Comments:  Room 24   [facility] History and Physical  PCP:  Fannie Pcp    Chief Complaint: Syncopal episode      History Of Present Illness  Amparo Lazar is a 54 year old female, patient of Dr. Greco Pcp, and past medical history of breast cancer (s/p resection) and factor 5 leiden (not on AC, hx of DVT).  Who presented to the ED via EMS after a syncopal episode at a grocery store.  Patient stated that she leaned forward to pick some pain up and felt lightheaded with bilateral hand numbness, and she says she started going into her car.  Per paramedics patient was found backed into the  cart sanchez and reported to be hyperventilating and anxious on the field.  Patient arrived in ER complaining of left arm numbness.  Denies similar episodes in the past, stated that she has had some cervical injury to her neck with some spinal compressions.  Patient denies fever, chills, sick contact, shortness of breath, chest pain, abdominal pain, dysuria, hematuria, or diarrhea.    Upon arrival the patient's vitals were  height is 5' 5\" (1.651 m) and weight is 76 kg (167 lb 8.8 oz). Her oral temperature is 98.6 °F (37 °C). Her blood pressure is 104/71 and her pulse is 62. Her respiration is 18 and oxygen saturation is 95%. .  Labs -chloride 109, albumin 3.5, troponin 0.02, d-dimer 0.64, see image studies below.  Neuro consulted in ER.    Patient was seen and examined on the floor unit, resting in her room, denies chest pain, shortness of breath, tingling, or numbness.  Patient endorsed neck pain started from her history of injury 7 spinal compressions.       Review of Systems  Review of Systems   Constitutional: Negative.    HENT: Negative.    Eyes: Negative.    Respiratory: Negative.    Cardiovascular: Negative.    Gastrointestinal: Negative.    Endocrine: Negative.    Genitourinary: Negative.    Musculoskeletal: Negative.    Skin: Negative.    Neurological: Negative.    Hematological: Negative.    Psychiatric/Behavioral: Negative.    Patient  endorses tingling sensation with position changes as stated had an episode of tingling while an MRI table.      Past Medical History  Past Medical History:   Diagnosis Date   • Factor 5 Leiden mutation, heterozygous (CMS/HCC)    • History of breast cancer         Surgical History  Past Surgical History:   Procedure Laterality Date   • Bilateral oophorectomy     • Breast lumpectomy     • Breast reconstruction     • Knee surgery     • Mastectomy     • Tonsillectomy     • Umbilical hernia repair          Social History  Social History     Tobacco Use   • Smoking status: Never Smoker   • Smokeless tobacco: Never Used   Substance Use Topics   • Alcohol use: Never     Frequency: Never   • Drug use: Never       Family History  Family History   Problem Relation Age of Onset   • Cancer, Colon Other    • Cancer, Rectal Neg Hx    • Stomach Cancer Neg Hx    • Cancer, Esophageal Neg Hx         Allergies  ALLERGIES:   Allergen Reactions   • Cefazolin SWELLING   • Scopolamine SWELLING       Medications  Medications Prior to Admission   Medication Sig Dispense Refill   • LORazepam (ATIVAN) 1 MG tablet Take 1 mg by mouth at bedtime.            Last Recorded Vitals  Vitals with min/max:    Vital Last Value 24 Hour Range   Temperature 98.6 °F (37 °C) (08/23/20 2128) Temp  Min: 98.2 °F (36.8 °C)  Max: 98.8 °F (37.1 °C)   Pulse 62 (08/24/20 0115) Pulse  Min: 62  Max: 89   Respiratory 18 (08/24/20 0115) Resp  Min: 16  Max: 18   Non-Invasive  Blood Pressure 104/71 (08/24/20 0115) BP  Min: 104/71  Max: 147/97   Pulse Oximetry 95 % (08/24/20 0115) SpO2  Min: 95 %  Max: 98 %   Arterial   Blood Pressure   No data recorded        Physical Exam  Constitutional:       Appearance: She is well-developed.   HENT:      Head: Normocephalic and atraumatic.      Right Ear: External ear normal.      Left Ear: External ear normal.      Nose: Nose normal.   Eyes:      Conjunctiva/sclera: Conjunctivae normal.      Pupils: Pupils are equal, round, and  reactive to light.   Neck:      Musculoskeletal: Normal range of motion and neck supple.   Cardiovascular:      Rate and Rhythm: Normal rate and regular rhythm.      Heart sounds: Normal heart sounds.   Pulmonary:      Effort: Pulmonary effort is normal.      Breath sounds: Normal breath sounds.   Abdominal:      General: Bowel sounds are normal.      Palpations: Abdomen is soft.   Musculoskeletal: Normal range of motion.   Skin:     General: Skin is warm and dry.   Neurological:      Mental Status: She is alert and oriented to person, place, and time.      Deep Tendon Reflexes: Reflexes are normal and symmetric.   Psychiatric:         Behavior: Behavior normal.         Thought Content: Thought content normal.         Judgment: Judgment normal.          Imaging  Mri Brain W Wo Contrast    Result Date: 8/24/2020  Exam: MRI brain with and without contrast. CLINICAL HISTORY: Syncope.  Lightheadedness.  Bilateral hand numbness.  History of breast cancer. TECHNIQUE: Multiphase, multiplanar MRI imaging of the brain was performed following the intravenous injection of 7.5 mL of Gadavist contrast. COMPARISON: CT head without contrast from 08/23/2020. FINDINGS: The diffusion images show no area of abnormal signal to indicate acute ischemia. There is a mass lesion centered along the anterior medial aspect of the left middle cranial fossa which is a large surface of the dural contact and likely is extra-axial in nature.  The lesion is intermediate signal intensity on the T1 and T2 images and shows avid rather homogenous contrast enhancement.  Axial dimensions measure 2.8 cm x 2.4 cm (image 126, series 12).  Inferior to superior dimensions measure 2.9 cm (image 78, series 14).  The lesion is causing mass effect on the adjacent middle cerebral  artery and displacing it superiorly.  The lesion abuts the left cavernous sinus does not show definite evidence of cavernous sinus invasion.  There is no surrounding edema.  No additional  enhancing lesions are seen.  This probably represents a meningioma.  Dural metastasis is considered less likely.     2.8 cm x 2.4 cm x 2.9 cm mass lesion in the medial aspect of the left middle cranial fossa as detailed above.  This likely is an extra-axial lesion.  There is no surrounding edema.  This is favored to represent a meningioma.  Given history of malignancy dural metastasis cannot be excluded. No evidence of acute ischemia. Electronically Signed by: JOSH WICK M.D. Signed on: 8/24/2020 12:58 AM     Mri Cervical Spine Wo Contrast    Result Date: 8/24/2020  Exam: MRI cervical spine without contrast. CLINICAL HISTORY: Neck pain.  Bilateral upper extremity paresthesia. TECHNIQUE: Multiphase, multiplanar MRI imaging of the cervical spine was performed without contrast. Adjustment of the mA and/or kV was done according to the patient's size. COMPARISON: CT cervical spine from 08/23/2020. FINDINGS: Vertebral body alignment is within normal limits.  No major marrow edema is evident to suggest acute fracture.  Vertebral body heights are maintained.  There is mild narrowing of the intervertebral disc space at the C5-C6 level.  No cord signal abnormality is evident.  Paraspinal soft tissues are grossly unremarkable. C2-C3: No significant disc bulge, central canal stenosis or neural foraminal compromise. C3-C4: No significant disc bulge, central canal stenosis or neural foraminal compromise. C4-C5: Minimal posterior disc bulge effacing the ventral thecal sac.  No central canal stenosis or neural foraminal compromise. C5-C6: Mild posterior disc bulge.  There is effacement of the ventral and dorsal thecal sac with mild flattening of the cervical spinal cord.  No definite cord signal abnormality.  There is suspected at least mild right neural foraminal narrowing. C6-C7: Minimal posterior disc bulge.  There is effacement of the ventral thecal sac with mild flattening of the cervical spinal cord.  No cord signal  abnormality is seen.  There is suspected at least mild left neural foraminal narrowing.     Degenerative spondylosis as detailed above. Electronically Signed by: JOSH WICK M.D. Signed on: 8/24/2020 1:09 AM     Ct Cervical Spine Wo Contrast    Result Date: 8/23/2020  CT OF THE CERVICAL SPINE WITHOUT CONTRAST HISTORY: Neck pain COMPARISON: None TECHNIQUE: CT scan of the cervical spine was obtained without contrast. Sagittal and coronal reformats were obtained.  One or more of the following radiation dose reduction techniques were used for this examination: Exposure control, adjustment of the mA and/or kV according to patient size, or use of iterative reconstruction technique. FINDINGS: Cervical spine curvature and alignment is within normal limits. Vertebral body heights are maintained.  Multilevel disc height decrease in endplate osteophytes are noted.  Uncovertebral osteophytes are noted.  Atlantoaxial joint osteophytosis is noted.  No significant central spinal canal stenosis is noted. Multilevel bilateral mild to moderate bony neural foramina narrowing is noted. No acute displaced fractures is noted. Paraspinous soft tissue of the neck is within normal limits.     1. No acute displaced fracture or dislocation.  2.  Multilevel mild-to-moderate cervical spine spondylosis. Electronically Signed by: RICK GONZALES M.D. Signed on: 8/23/2020 7:11 PM     Ct Head Wo Contrast    Result Date: 8/23/2020  CT HEAD WITHOUT CONTRAST HISTORY: Syncope, headache COMPARISON: None. TECHNIQUE: CT scan of the head is obtained without  IV contrast.  One or more of the following radiation dose reduction techniques were used for this examination: Exposure control, adjustment of the mA and/or kV according to patient size, or use of iterative reconstruction technique. FINDINGS:  Left middle cranial fossa skull base mildly hyperdense soft tissue mass is noted measuring 2.7 x 2.6 cm on image nine series 2.  It may be extra-axial.  It  causes mass effect in the left temporal pole.  Although meningioma is possible, further evaluation with MRI would be of benefit. Right posterior occipital calcification on image 10 series 2 is noted, possible vascular calcification versus cerebellar cortical calcification.  Attention on MRI.  No abnormal extra-axial fluid collections are noted. No large vascular territorial acute ischemic infarct is noted. No unenhanced CT evidence of intracranial mass lesion is noted. CT without contrast may be insensitive for detection of subtle mass lesion or early acute ischemic infarct. Bony calvarium is intact. Visualized paranasal sinuses and mastoid air cells are patent and aerated.     1. No acute intracranial bleed or calvarial fracture.  2.  Left middle cranial fossa probable extra-axial soft tissue mass, possible meningioma.  However, further evaluation with MRI.  This causes mild mass effect on the left medial temporal lobe. 3.  Right posterior lateral occipital calcification, adjacent to the inner table, possible vascular calcification versus cerebellar cortical calcification.  Attention on MRI. Electronically Signed by: RICK GONZALES M.D. Signed on: 8/23/2020 7:07 PM     Cta Chest Pulmonary Embolism W Contrast    Result Date: 8/23/2020  CT CHEST PULMONARY ANGIOGRAM HISTORY: Epigastric pain COMPARISON: None. TECHNIQUE: Helical axial CT images of the chest were obtained with  94 cc of Omnipaque 350 of IV contrast according to PE protocol. 3-D postprocessing is performed including MIPS imaging by technologist under direct supervision of radiologist. One or more of the following radiation dose reduction techniques were used for this examination: Exposure control, adjustment of the mA and/or kV according to patient size, or use of iterative reconstruction technique. FINDINGS: Pulmonary arteries: There is excellent opacification of the pulmonary vasculature. There is no evidence of abnormal vascular cut-offs or filing  defects to suggest presence of pulmonary emboli. Evaluation of several segmental and subsegmental branches of pulmonary artery is limited due to motion artifact. Severe/Large pulmonary embolism present: No. Ancillary finding: Interventricular septal flattening or bowing: Not present. Venous contrast reflux: No significant contrast reflux noted. Other cardiovascular structures: No thoracic aortic aneurysm or dissection is noted. Lungs: Mild pulmonary vascular prominence is noted.  Mild interstitial densities are noted with minimal patchy groundglass densities.  Findings may represent mild interstitial pulmonary edema.  Mild bibasilar atelectasis is noted.  Pleural cavity: No pleural effusion or thickening is noted.  Mediastinum: No lymphadenopathy is noted. No pericardial effusion or thickening is identified. Triangular soft tissue density in the anterior mediastinum is suggestive of residual or rebound thymic tissue. Chest wall/axilla: Left axillary dissection postsurgical changes are noted.  Bilateral breast implants are noted. Visualized upper abdomen: Upright two 50% stenosis of the celiac artery origin is noted.  Left lateral hepatic peripherally enhancing lesion is partially visualized measuring 3.4 x 2.9 cm, nonspecific.  Adjacent multiple other left lateral hepatic hypodense lesions are noted.  Right hepatic subcentimeter hypodense lesion is also noted.  These are indeterminate.  Further evaluation with MRI. Osseous structures: No destructive lesions.     1. No pulmonary embolism.  Study mildly limited due to motion artifact. 2.  Mild bibasilar atelectasis.  Mild interstitial densities and patchy groundglass density, possible mild interstitial pulmonary edema.  No focal consolidation. 3.  Left lateral hepatic peripherally enhancing lesion measuring up to 3.4 cm.  Multiple other hepatic hypodense lesions.  These are indeterminate.  Further evaluation with MRI. 4.  Triangular soft tissue in the anterior  mediastinum, possible thymic residual/rebound tissue. Electronically Signed by: RICK GONZALES M.D. Signed on: 8/23/2020 7:19 PM       Labs   Recent Labs   Lab 08/23/20  1650   WBC 5.3   HCT 38.4   HGB 12.6      SODIUM 140   POTASSIUM 3.6   CHLORIDE 109*   CO2 23   CALCIUM 8.8   GLUCOSE 99   BUN 10   CREATININE 0.75   AST 20   GPT 19   ALKPT 53   BILIRUBIN 0.2   ALBUMIN 3.5*   GFRNA 90     Assessment and Plan:  Syncope rule out ACS vs Neurovascular disorders / Hx breast ca  Ct Head Wo Contrast - No acute intracranial bleed or calvarial fracture. Left middle cranial fossa probable extra-axial soft tissue mass, possible meningioma.    Mri Brain W Wo Contrast -  2.8 cm x 2.4 cm x 2.9 cm mass lesion in the medial aspect of the left middle cranial fossa as detailed above.  This likely is an extra-axial lesion.  There is no surrounding edema.  This is favored to represent a meningioma.  Given history of malignancy dural metastasis cannot be excluded. No evidence of acute ischemia    Neurosurgery on consult   Continue neuro checks   BP goal normotensive   Hold all AC/AP/NSAIDs until surgical plan finalized   DVT Ppx: SCDs, Hold chemo ppx for now   Monitor   Elevated D dimer    D-dimer 0.64   Troponin 0.02   Cta Chest Pulmonary Embolism W Contrast - No pulmonary embolism.   Patient oxygenating well on room air   Monitor  Possible liver lesion -child with history of breast cancer   CTA noted - Left lateral hepatic peripherally enhancing lesion measuring up to 3.4  cm.  Multiple other hepatic hypodense lesions.  These are indeterminate. Further  evaluation with MRI.    May consider further works as indicated   Normal liver enzymes  Neck and upper back pain  Ct Cervical Spine Wo Contrast - No acute displaced fracture or dislocation.  Multilevel mild-to-moderate cervical spine spondylosis.   Mri Cervical Spine Wo Contrast - Degenerative spondylosis   PRN tylenol    Consider PT /OT when stable   Monitor  History of of  breast cancer -patient started normal current treatment  History of 5 [place] mutation, heterozygous -patient not on anticoagulants   Followed outpatient hemo-oncologist - Dr. Dr. Otilio Benavidez    May consult for further management of the above    Code Status    Code Status: Full Resuscitation    DVT Prophylaxis   SCDs    Disposition: Pending clinical improvement and neurosurgery plan of care    Primary Care Physician  No Pcp    All patient questions answered  All labs and imaging reviewed    Charting performed by patricia Woody CNP for     Care discussed with LEDA Rojas    All medical record entries made by the patricia were at my direction. I have reviewed the chart  and agree that the record accurately reflects my personal performance of the history, physical  exam, hospital course, and assessment and plan.

## 2024-04-29 NOTE — CONSULTS
General Surgery Consult    [facility] [place] MRN# [id number]     Date of Admission: 4/29/2024    Reason for Consult  Cholecystitis    History of Present Illness  Patient is a 33-year-old woman with no significant past medical history presents the emergency department complaining of right upper quadrant pain that began 3 days ago.  Pain is made worse with food.  She has had an episode of this before brought her into the emergency department.  Her workup revealed cholelithiasis and a distended gallbladder.  She continues to have pain.  She denies fevers.    Past Medical History:  Denies any medical history  Past Medical History:   Diagnosis Date    Postpartum hemorrhage 02/11/2018       Past Surgical History:  No prior surgeries    Allergies:   No Known Allergies  Medications:  No current facility-administered medications for this encounter.     Current Outpatient Medications   Medication Sig Dispense Refill    etonogestrel (NEXPLANON) 68 MG IMPL 1 each (68 mg) by Subdermal route once      ibuprofen (ADVIL/MOTRIN) 600 MG tablet Take 1 tablet (600 mg) by mouth daily as needed for moderate pain 50 tablet 2    omeprazole (PRILOSEC) 20 MG DR capsule Take 20 mg by mouth daily      SUMAtriptan (IMITREX) 25 MG tablet Take 1 tablet (25 mg) by mouth at onset of headache for migraine May repeat in 2 hours. Max 8 tablets/24 hours. 20 tablet 3     Social History:  Social History     Socioeconomic History    Marital status:      Spouse name: Not on file    Number of children: Not on file    Years of education: Not on file    Highest education level: Not on file   Occupational History    Not on file   Tobacco Use    Smoking status: Never     Passive exposure: Never    Smokeless tobacco: Never    Tobacco comments:     no passive exposure   Vaping Use    Vaping status: Never Used   Substance and Sexual Activity    Alcohol use: No    Drug use: No    Sexual activity: Yes     Partners: Male   Other Topics Concern    Not on file    Social History Narrative    Not on file     Social Determinants of Health     Financial Resource Strain: Low Risk  (3/28/2024)    Financial Resource Strain     Within the past 12 months, have you or your family members you live with been unable to get utilities (heat, electricity) when it was really needed?: No   Food Insecurity: Low Risk  (3/28/2024)    Food Insecurity     Within the past 12 months, did you worry that your food would run out before you got money to buy more?: No     Within the past 12 months, did the food you bought just not last and you didn t have money to get more?: No   Transportation Needs: Low Risk  (3/28/2024)    Transportation Needs     Within the past 12 months, has lack of transportation kept you from medical appointments, getting your medicines, non-medical meetings or appointments, work, or from getting things that you need?: No   Physical Activity: Inactive (3/28/2024)    Exercise Vital Sign     Days of Exercise per Week: 0 days     Minutes of Exercise per Session: 0 min   Stress: No Stress Concern Present (3/28/2024)    [employer] - Occupational Stress Questionnaire     Feeling of Stress : Not at all   Social Connections: Unknown (3/28/2024)    Social Connection and Isolation Panel [NHANES]     Frequency of Communication with Friends and Family: Not on file     Frequency of Social Gatherings with Friends and Family: Twice a week     Attends [employer] Services: Not on file     Active Member of Clubs or Organizations: Not on file     Attends Club or Organization Meetings: Not on file     Marital Status: Not on file   Interpersonal Safety: Low Risk  (3/28/2024)    Interpersonal Safety     Do you feel physically and emotionally safe where you currently live?: Yes     Within the past 12 months, have you been hit, slapped, kicked or otherwise physically hurt by someone?: No     Within the past 12 months, have you been humiliated or emotionally abused in other  ways by your partner or ex-partner?: No   Housing Stability: Low Risk  (3/28/2024)    Housing Stability     Do you have housing? : Yes     Are you worried about losing your housing?: No     Family History:  No family history on file.    ROS:  12 point review negative except as stated in H&P    Exam:  /77   Pulse 85   Temp 99.1  F (37.3  C)   Resp 20   LMP  (LMP Unknown)   SpO2 99%   General: Alert, interactive, NAD  HEENT: Sclera anicteric  Resp: Non-labored breathing  Cardiac: RRR  Abdomen: Nondistended.  Focally tender in the right upper quadrant    Labs:   Personally reviewed  WBC 9.4  LFTs all normal    Imaging:   Personally reviewed  Ultrasound showing cholelithiasis and a distended gallbladder.    Assessment: 33 year old female presenting with cholecystitis.  We discussed the need for laparoscopic cholecystectomy.  This included discussion of the details the operation and the risks of bleeding, infection, injury to bile ducts and other intra-abdominal structures.  The patient's questions were answered and she agrees to proceed.  Patient did eat a banana around 1030 and needs at least 6 hours from this before surgery.  This will place her surgery around 430 5:00.    Plan:   -OR this afternoon for laparoscopic cholecystectomy  -Anticipate discharge after surgery  -Can admit to surgery for observation to await surgery.    Bhanu Alicia MD  General Surgeon  [facility]  Surgery Clinic - [address]  [place] [address]  Office: [phone]

## 2024-04-29 NOTE — ANESTHESIA PROCEDURE NOTES
Airway       Patient location during procedure: OR       Procedure Start/Stop Times: 4/29/2024 4:59 PM  Staff -        CRNA: Ligia Odonnell APRN CRNA       Performed By: CRNA  Consent for Airway        Urgency: elective  Indications and Patient Condition       Indications for airway management: nicole-procedural       Induction type:intravenous       Mask difficulty assessment: 1 - vent by mask    Final Airway Details       Final airway type: endotracheal airway       Successful airway: ETT - single and Oral  Endotracheal Airway Details        ETT size (mm): 7.0       Cuffed: yes       Successful intubation technique: video laryngoscopy       VL Blade Size: Glidescope 3       Grade View of Cords: 1       Adjucts: stylet       Position: Right       Measured from: gums/teeth       Secured at (cm): 20       Bite block used: None    Post intubation assessment        Placement verified by: capnometry, equal breath sounds and chest rise        Number of attempts at approach: 1       Number of other approaches attempted: 0       Secured with: tape       Ease of procedure: easy       Dentition: Intact and Unchanged    Medication(s) Administered   Medication Administration Time: 4/29/2024 4:59 PM

## 2024-04-29 NOTE — PLAN OF CARE
Problem: Adult Inpatient Plan of Care  Goal: Absence of Hospital-Acquired Illness or Injury  Intervention: Prevent Skin Injury  Recent Flowsheet Documentation  Taken 4/29/2024 1400 by Maria Fernanda Arthur, RN  Body Position: position changed independently  Taken 4/29/2024 1300 by Maria Fernanda Arthur, RN  Body Position: position changed independently     Problem: Adult Inpatient Plan of Care  Goal: Optimal Comfort and Wellbeing  Outcome: Progressing   Goal Outcome Evaluation:       Pt is progressing with these goals.  Pt refused pain meds currently, tender ruq.  Pt is independent, vss.

## 2024-04-29 NOTE — OP NOTE
Operative Note:  Laparoscopic Cholecystectomy    Name:  Harman Ehku Moh  PCP:  Lucia Daily  Procedure Date:  4/29/2024      Procedure(s):  CHOLECYSTECTOMY, LAPAROSCOPIC     Pre-Procedure Diagnosis:  Cholecystitis    Post-Procedure Diagnosis:    Cholecystits    Surgeon(s):  Simon Conklin MD      Anesthesia Type:  GET      Findings:  Gall Stones with an inflamed Gall Bladder.  Hydrops gallbladder, the cystic duct was full of stones and solid debris.    Operative Report:    The patient was taken to Operating Room, identified, and the procedure verified as Laparoscopic Cholecystectomy  A Time Out was held.    General endotracheal anesthesia was administered and tolerated well. After the induction, the abdomen was prepped in the usual sterile fashion. The patient was positioned in the supine position. They were sterilely prepped and draped in the usual surgical fashion.    Local anesthetic agent was injected into the skin near the umbilicus and an incision made. A periumbilical incision was made and a 5mm trocar was placed under direct visualization using the optiview technique, and a pneumoperitoneum was brought up to 15 mm Hg. . 2 5's and a  11 mm port was placed under direct vision.  All skin incisions were infiltrated with a local anesthetic agent before making the incision and placing the trocars.     The gallbladder was identified and found to be inflamed and tense with pressure.  The gallbladder was pierced with an aspiration needle and the bile within the gallbladder was aspirated.  The fluid in the gallbladder was clear consistent with that of a hydrops gallbladder.  The fundus of the gallbladder was grasped and retracted cephalad up over the inferior edge of the liver. The infundibulum was grasped and retracted laterally, exposing the neck of the gallbladder.  The visceral peritoneum overlying the angle of Calot was dissected through with hook cautery. The cystic duct was clearly identified and  bluntly dissected circumferentially. The junctions of the gallbladder and the cystic duct was clearly identified.  The cystic duct was found to be very dilated and full of debris.  I partially transected the cystic duct at the junction between the gallbladder and the cystic duct.  I then milked the cystic duct back towards the gallbladder and expressed stones and sludge material out into the intraperitoneal cavity.  This was aspirated up and the duct was cleaned of its intraluminal debris.  The duct was irrigated and then the duct was secured with 3 extra-large manual clips between the common bile duct and the duct autotomy that had been made.  1 extra-large clip was placed on the gallbladder side.  The cystic artery was identified, it was dissected free of surrounding structures.  The cystic artery was clipped and divided.      The gallbladder was dissected from the liver bed in retrograde fashion with the electrocautery. The gallbladder was removed by placing it in a endocatch bag and extracting it from through the 11 mm trocar site.   The fascia of the 10 mm trocar site was then closed with 0 vicryl using an Endo fascial closure device under direct physician laparoscope     Pneumoperitoneum was reduced.  The trocars were removed.  The skin was then closed with 4-0 monocryl and a sterile dressing was applied.    Instrument, sponge, and needle counts were correct at closure and at the conclusion of the case.    Estimated Blood Loss:   10 cc    Specimens:    Gall Bladder       Drains:   None    Complications:    None    Simon Conklin MD     Date: 4/29/2024  Time: 5:07 PM

## 2024-04-29 NOTE — ANESTHESIA CARE TRANSFER NOTE
Patient: Harman Mercado [facility] [place]    Procedure: Procedure(s):  CHOLECYSTECTOMY, LAPAROSCOPIC       Diagnosis: Calculus of gallbladder without cholecystitis without obstruction [K80.20]  Diagnosis Additional Information: No value filed.    Anesthesia Type:   General     Note:    Oropharynx: oropharynx clear of all foreign objects and spontaneously breathing  Level of Consciousness: awake  Oxygen Supplementation: face mask  Level of Supplemental Oxygen (L/min / FiO2): 6  Independent Airway: airway patency satisfactory and stable  Dentition: dentition unchanged  Vital Signs Stable: post-procedure vital signs reviewed and stable  Report to RN Given: handoff report given  Patient transferred to: PACU    Handoff Report: Identifed the Patient, Identified the Reponsible Provider, Reviewed the pertinent medical history, Discussed the surgical course, Reviewed Intra-OP anesthesia mangement and issues during anesthesia, Set expectations for post-procedure period and Allowed opportunity for questions and acknowledgement of understanding      Vitals:  Vitals Value Taken Time   /76 04/29/24 1808   Temp 98.4    Pulse 91 04/29/24 1811   Resp 25 04/29/24 1811   SpO2 93 % 04/29/24 1811   Vitals shown include unfiled device data.    Electronically Signed By: DIANA Weeks CRNA  April 29, 2024  6:13 PM

## 2024-04-29 NOTE — ANESTHESIA PREPROCEDURE EVALUATION
"Anesthesia Pre-Procedure Evaluation    Patient: Harman Mercado [facility] [place]   MRN: [id number] : 1990        Procedure : Procedure(s):  CHOLECYSTECTOMY, LAPAROSCOPIC          Past Medical History:   Diagnosis Date    Postpartum hemorrhage 2018      History reviewed. No pertinent surgical history.   No Known Allergies   Social History     Tobacco Use    Smoking status: Never     Passive exposure: Never    Smokeless tobacco: Never    Tobacco comments:     no passive exposure   Substance Use Topics    Alcohol use: No      Wt Readings from Last 1 Encounters:   24 70.1 kg (154 lb 9.6 oz)        Anesthesia Evaluation   Pt has had prior anesthetic.     No history of anesthetic complications       ROS/MED HX  ENT/Pulmonary:       Neurologic:       Cardiovascular:       METS/Exercise Tolerance:     Hematologic:       Musculoskeletal:       GI/Hepatic:       Renal/Genitourinary:       Endo:       Psychiatric/Substance Use:       Infectious Disease:       Malignancy:       Other:            Physical Exam    Airway        Mallampati: III    Neck ROM: full     Respiratory Devices and Support         Dental       (+) Minor Abnormalities - some fillings, tiny chips      Cardiovascular   cardiovascular exam normal          Pulmonary   pulmonary exam normal                OUTSIDE LABS:  CBC:   Lab Results   Component Value Date    WBC 9.4 2024    WBC 9.8 2023    HGB 12.8 2024    HGB 12.4 2023    HCT 39.0 2024    HCT 38.7 2023     2024     2023     BMP:   Lab Results   Component Value Date     2024     2021    POTASSIUM 3.9 2024    POTASSIUM 3.8 2021    CHLORIDE 104 2024    CHLORIDE 110 (H) 2021    CO2 26 2024    CO2 19 (L) 2021    BUN 12.7 2024    BUN 11 2021    CR 0.53 2024    CR 0.68 2021     (H) 2024     2021     COAGS: No results found for: \"PTT\", \"INR\", " "\"FIBR\"  POC:   Lab Results   Component Value Date    HCG Negative 04/29/2024     HEPATIC:   Lab Results   Component Value Date    ALBUMIN 4.3 04/29/2024    PROTTOTAL 7.8 04/29/2024    ALT 20 04/29/2024    AST 19 04/29/2024    ALKPHOS 118 04/29/2024    BILITOTAL 0.2 04/29/2024     OTHER:   Lab Results   Component Value Date    LACT 1.4 07/05/2021    EMILIA 9.6 04/29/2024    LIPASE 23 04/29/2024       Anesthesia Plan    ASA Status:  2, emergent       Anesthesia Type: General.     - Airway: ETT   Induction: RSI.           Consents    Anesthesia Plan(s) and associated risks, benefits, and realistic alternatives discussed. Questions answered and patient/representative(s) expressed understanding.     - Discussed: Risks, Benefits and Alternatives for the PROCEDURE were discussed     - Discussed with:  Patient      - Extended Intubation/Ventilatory Support Discussed: No.      - Patient is DNR/DNI Status: No     Use of blood products discussed: No .     Postoperative Care    Pain management: Multi-modal analgesia.   PONV prophylaxis: Ondansetron (or other 5HT-3), Dexamethasone or Solumedrol     Comments:               Kaylyn Galeana MD    I have reviewed the pertinent notes and labs in the chart from the past 30 days and (re)examined the patient.  Any updates or changes from those notes are reflected in this note.                  "

## 2024-04-29 NOTE — ED PROVIDER NOTES
EMERGENCY DEPARTMENT ENCOUNTER      NAME: Harman Mercado [facility] [place]  AGE: 33 year old female  YOB: 1990  MRN: [id number]  EVALUATION DATE & TIME: 2024  7:32 AM    PCP: Lucia Daily    ED PROVIDER: Simon Sullivan D.O.      Chief Complaint   Patient presents with    Abdominal Pain       FINAL IMPRESSION:  1. Calculus of gallbladder without cholecystitis without obstruction        ED COURSE & MEDICAL DECISION MAKIN:31 AM I met with the patient to gather history and to perform my initial exam. I discussed the plan for care while in the Emergency Department.  10:29 AM I spoke on the phone with Dr. Alicia, general surgery.   10:31 AM I updated the patient and discussed further plan of care. Asked patient about NPO status, and she told me that she ate a banana about 30 minutes ago.   10:41 I paged general surgery.   10:45 I spoke on the phone with Dr. Alicia, general surgery, to update him on the patient's PO status. Will admit for observation and plan for surgery tomorrow. Dr. Alicia will come to patient's bedside and answer her questions.            Pertinent Labs & Imaging studies reviewed. (See chart for details)  33 year old female presents to the Emergency Department for evaluation of abdominal pain.  Initial differential did include cholelithiasis, choledocholithiasis, cholecystitis pancreatitis, other acute process.  Lab testing this patient was largely unremarkable.  However, ultrasound imaging does show obvious cholelithiasis, with potential for chronic cholecystitis.  Antibiotics have been started in the emergency department, I did discuss results with the patient, patient will be admitted to the observation service under the neurosurgeon.  Discussed with general surgery who agreed and requested the admission to observation.    Medical Decision Making  Obtained supplemental history:Supplemental history obtained?: Documented in chart  Reviewed external records: External records reviewed?:  "Documented in chart  Care impacted by chronic illness:N/A  Care significantly affected by social determinants of health:N/A  Did you consider but not order tests?: Work up considered but not performed and documented in chart, if applicable  Did you interpret images independently?: Independent interpretation of ECG and images noted in documentation, when applicable.  Consultation discussion with other provider:Did you involve another provider (consultant, , pharmacy, etc.)?: I discussed the care with another health care provider, see documentation for details.  Admit.    At the conclusion of the encounter I discussed the results of all of the tests and the disposition. The questions were answered. The patient or family acknowledged understanding and was agreeable with the care plan.        HPI    Patient information was obtained from: Patient     Use of : Yes (Phone) - Language: Nabila Abbott is a 33 year old female who presents via walk-in for evaluation of abdominal pain.     The patient reports that since Friday (04/26/2024, 3 days ago), she has been experiencing \"burning\" upper abdominal pain. Patient notes that her pain is exacerbated by eating, and it hurts especially if she \"eats too much.\" She endorses associated nausea and chills.     Denies fever, vomiting, diarrhea, dysuria, hematuria, chest pain, shortness of breath, or any other concerns at this time. Patient denies tobacco or alcohol use. No known allergies and no pertinent medical or surgical history.       REVIEW OF SYSTEMS  Constitutional:  Denies fever, weight loss or weakness, Positive for chills  Eyes:  No pain, discharge, redness  HENT:  Denies sore throat, ear pain, congestion  Respiratory: No SOB, wheeze or cough  Cardiovascular:  No CP, palpitations  GI:  Denies vomiting, diarrhea, Positive for abdominal pain and nausea   : Denies dysuria, hematuria  Musculoskeletal:  Denies any new muscle/joint pain, swelling or loss " of function.  Skin:  Denies rash, pallor  Neurologic:  Denies headache, focal weakness or sensory changes  Lymph: Denies swollen nodes    All other systems negative unless noted in HPI.    PAST MEDICAL HISTORY:  Past Medical History:   Diagnosis Date    Postpartum hemorrhage 02/11/2018       PAST SURGICAL HISTORY:  No past surgical history on file.      CURRENT MEDICATIONS:    Current Facility-Administered Medications   Medication Dose Route Frequency Provider Last Rate Last Admin    piperacillin-tazobactam (ZOSYN) 3.375 g vial to attach to  mL bag  3.375 g Intravenous Once Simon Sullivan, DO         Current Outpatient Medications   Medication Sig Dispense Refill    etonogestrel (NEXPLANON) 68 MG IMPL 1 each (68 mg) by Subdermal route once      ibuprofen (ADVIL/MOTRIN) 600 MG tablet Take 1 tablet (600 mg) by mouth daily as needed for moderate pain 50 tablet 2    SUMAtriptan (IMITREX) 25 MG tablet Take 1 tablet (25 mg) by mouth at onset of headache for migraine May repeat in 2 hours. Max 8 tablets/24 hours. 20 tablet 3         ALLERGIES:  No Known Allergies    FAMILY HISTORY:  No family history on file.    SOCIAL HISTORY:  Social History     Socioeconomic History    Marital status:    Tobacco Use    Smoking status: Never     Passive exposure: Never    Smokeless tobacco: Never    Tobacco comments:     no passive exposure   Vaping Use    Vaping status: Never Used   Substance and Sexual Activity    Alcohol use: No    Drug use: No    Sexual activity: Yes     Partners: Male     Social Determinants of Health     Financial Resource Strain: Low Risk  (3/28/2024)    Financial Resource Strain     Within the past 12 months, have you or your family members you live with been unable to get utilities (heat, electricity) when it was really needed?: No   Food Insecurity: Low Risk  (3/28/2024)    Food Insecurity     Within the past 12 months, did you worry that your food would run out before you got money to buy  more?: No     Within the past 12 months, did the food you bought just not last and you didn t have money to get more?: No   Transportation Needs: Low Risk  (3/28/2024)    Transportation Needs     Within the past 12 months, has lack of transportation kept you from medical appointments, getting your medicines, non-medical meetings or appointments, work, or from getting things that you need?: No   Physical Activity: Inactive (3/28/2024)    Exercise Vital Sign     Days of Exercise per Week: 0 days     Minutes of Exercise per Session: 0 min   Stress: No Stress Concern Present (3/28/2024)    [employer] [place] [employer] - Occupational Stress Questionnaire     Feeling of Stress : Not at all   Social Connections: Unknown (3/28/2024)    Social Connection and Isolation Panel [NHANES]     Frequency of Social Gatherings with Friends and Family: Twice a week   Interpersonal Safety: Low Risk  (3/28/2024)    Interpersonal Safety     Do you feel physically and emotionally safe where you currently live?: Yes     Within the past 12 months, have you been hit, slapped, kicked or otherwise physically hurt by someone?: No     Within the past 12 months, have you been humiliated or emotionally abused in other ways by your partner or ex-partner?: No   Housing Stability: Low Risk  (3/28/2024)    Housing Stability     Do you have housing? : Yes     Are you worried about losing your housing?: No       VITALS:  Patient Vitals for the past 24 hrs:   BP Temp Pulse Resp SpO2   04/29/24 1055 93/62 -- 80 -- 100 %   04/29/24 1029 101/65 -- 80 -- 99 %   04/29/24 1024 114/79 -- 78 -- 99 %   04/29/24 0831 129/82 -- 90 -- 99 %   04/29/24 0801 98/65 -- 81 -- 97 %   04/29/24 0742 103/60 -- 88 -- 98 %   04/29/24 0729 125/73 99.1  F (37.3  C) 88 20 99 %       PHYSICAL EXAM    VITAL SIGNS: BP 93/62   Pulse 80   Temp 99.1  F (37.3  C)   Resp 20   LMP  (LMP Unknown)   SpO2 100%     General Appearance: Well-appearing, well-nourished, no acute  distress   Head:  Normocephalic, without obvious abnormality, atraumatic  Eyes:  PERRL, conjunctiva/corneas clear, EOM's intact,  ENT:  Lips, mucosa, and tongue normal, membranes are moist without pallor  Neck:  Normal ROM, symmetrical, trachea midline    Chest:  No tenderness or deformity, no crepitus  Cardio:  Regular rate and rhythm, no murmur, rub or gallop, 2+ pulses symmetric in all extremities  Pulm:  Clear to auscultation bilaterally, respirations unlabored,  Back:  ROM normal, no CVA tenderness, no spinal tenderness, no paraspinal tenderness  Abdomen:  Soft, no rebound or guarding. Localized RUQ pain.  Musculoskeletal: Full ROM, no edema, no cyanosis, good ROM of major joints  Integument:  Warm, Dry, No erythema, No rash.    Neurologic:  Alert & oriented.  No focal deficits appreciated.  Ambulatory.  Psychiatric:  Affect normal, Judgment normal, Mood normal.      LABS  Results for orders placed or performed during the hospital encounter of 04/29/24 (from the past 24 hour(s))   [place] Draw    Narrative    The following orders were created for panel order [place] Draw.  Procedure                               Abnormality         Status                     ---------                               -----------         ------                     Extra Red Top Tube[[id number]]                               Final result               Extra Green Top (Lithium...[[id number]]                      Final result               Extra Purple Top Tube[[id number]]                            Final result                 Please view results for these tests on the individual orders.   Extra Red Top Tube   Result Value Ref Range    Hold Specimen JIC    Extra Green Top (Lithium Heparin) Tube   Result Value Ref Range    Hold Specimen JIC    Extra Purple Top Tube   Result Value Ref Range    Hold Specimen JIC    CBC with platelets + differential    Narrative    The following orders were created for panel order CBC with platelets +  differential.  Procedure                               Abnormality         Status                     ---------                               -----------         ------                     CBC with platelets and d...[[id number]]                      Final result                 Please view results for these tests on the individual orders.   Basic metabolic panel   Result Value Ref Range    Sodium 141 135 - 145 mmol/L    Potassium 3.9 3.4 - 5.3 mmol/L    Chloride 104 98 - 107 mmol/L    Carbon Dioxide (CO2) 26 22 - 29 mmol/L    Anion Gap 11 7 - 15 mmol/L    Urea Nitrogen 12.7 6.0 - 20.0 mg/dL    Creatinine 0.53 0.51 - 0.95 mg/dL    GFR Estimate >90 >60 mL/min/1.73m2    Calcium 9.6 8.6 - 10.0 mg/dL    Glucose 101 (H) 70 - 99 mg/dL   Hepatic function panel   Result Value Ref Range    Protein Total 7.8 6.4 - 8.3 g/dL    Albumin 4.3 3.5 - 5.2 g/dL    Bilirubin Total 0.2 <=1.2 mg/dL    Alkaline Phosphatase 118 40 - 150 U/L    AST 19 0 - 45 U/L    ALT 20 0 - 50 U/L    Bilirubin Direct <0.20 0.00 - 0.30 mg/dL   Lipase   Result Value Ref Range    Lipase 23 13 - 60 U/L   CBC with platelets and differential   Result Value Ref Range    WBC Count 9.4 4.0 - 11.0 10e3/uL    RBC Count 4.60 3.80 - 5.20 10e6/uL    Hemoglobin 12.8 11.7 - 15.7 g/dL    Hematocrit 39.0 35.0 - 47.0 %    MCV 85 78 - 100 fL    MCH 27.8 26.5 - 33.0 pg    MCHC 32.8 31.5 - 36.5 g/dL    RDW 13.5 10.0 - 15.0 %    Platelet Count 327 150 - 450 10e3/uL    % Neutrophils 77 %    % Lymphocytes 17 %    % Monocytes 5 %    % Eosinophils 1 %    % Basophils 0 %    % Immature Granulocytes 0 %    NRBCs per 100 WBC 0 <1 /100    Absolute Neutrophils 7.2 1.6 - 8.3 10e3/uL    Absolute Lymphocytes 1.6 0.8 - 5.3 10e3/uL    Absolute Monocytes 0.5 0.0 - 1.3 10e3/uL    Absolute Eosinophils 0.1 0.0 - 0.7 10e3/uL    Absolute Basophils 0.0 0.0 - 0.2 10e3/uL    Absolute Immature Granulocytes 0.0 <=0.4 10e3/uL    Absolute NRBCs 0.0 10e3/uL   UA with Microscopic reflex to Culture     Specimen: Urine, Clean Catch   Result Value Ref Range    Color Urine Light Yellow Colorless, Straw, Light Yellow, Yellow    Appearance Urine Clear Clear    Glucose Urine Negative Negative mg/dL    Bilirubin Urine Negative Negative    Ketones Urine Negative Negative mg/dL    Specific Gravity Urine 1.021 1.001 - 1.030    Blood Urine Negative Negative    pH Urine 7.0 5.0 - 7.0    Protein Albumin Urine Negative Negative mg/dL    Urobilinogen Urine <2.0 <2.0 mg/dL    Nitrite Urine Negative Negative    Leukocyte Esterase Urine 75 [place]/uL (A) Negative    Mucus Urine Present (A) None Seen /LPF    RBC Urine 1 <=2 /HPF    WBC Urine 4 <=5 /HPF    Squamous Epithelials Urine <1 <=1 /HPF    Transitional Epithelials Urine <1 <=1 /HPF    Narrative    Urine Culture not indicated   US Abdomen Limited    Narrative    EXAM: US ABDOMEN LIMITED  LOCATION: [facility]  DATE: 4/29/2024    INDICATION: RUQ abdominal pain  COMPARISON: Ultrasound 07/05/2021  TECHNIQUE: Limited abdominal ultrasound.    FINDINGS:    GALLBLADDER: Multiple stones in the gallbladder lumen. A nonmobile stone is noted within the gallbladder neck. Mild to moderate gallbladder wall thickening. Trace pericholecystic fluid. Negative sonographic Moody's sign.    BILE DUCTS: No intrahepatic biliary dilatation. The common duct measures 7 mm.    LIVER: Mild diffusely coarsened echotexture. Normal size and surface contour. Antegrade flow in the normal caliber main portal vein. No focal mass.    RIGHT KIDNEY: No hydronephrosis. Exophytic lower pole 4.2 cm simple cyst. No follow-up is indicated.    PANCREAS: The visualized portions are normal.    No ascites.      Impression    IMPRESSION:  1.  Cholelithiasis with gallbladder wall thickening likely reflecting chronic rather than acute cholecystitis. A nonmobile stone is noted within the gallbladder neck. If the patient's symptoms persist or worsen, short interval follow-up ultrasound   imaging or further  evaluation with a hepatobiliary nuclear medicine scan may be helpful.  2.  Hepatic steatosis.  3.  Borderline extra hepatic biliary ductal dilatation. No sonographic evidence for choledocholithiasis. Follow-up with MRCP may be helpful.  4.  Simple right renal cyst. No follow-up is indicated.             RADIOLOGY  US Abdomen Limited   Final Result   IMPRESSION:   1.  Cholelithiasis with gallbladder wall thickening likely reflecting chronic rather than acute cholecystitis. A nonmobile stone is noted within the gallbladder neck. If the patient's symptoms persist or worsen, short interval follow-up ultrasound    imaging or further evaluation with a hepatobiliary nuclear medicine scan may be helpful.   2.  Hepatic steatosis.   3.  Borderline extra hepatic biliary ductal dilatation. No sonographic evidence for choledocholithiasis. Follow-up with MRCP may be helpful.   4.  Simple right renal cyst. No follow-up is indicated.                 I have independently interpreted the above image, gallbladder wall thickening with gallstone on ultrasound imaging. See radiology report for detail.            MEDICATIONS GIVEN IN THE EMERGENCY:  Medications   piperacillin-tazobactam (ZOSYN) 3.375 g vial to attach to  mL bag (has no administration in time range)   sodium chloride 0.9% BOLUS 1,000 mL (0 mLs Intravenous Stopped 4/29/24 1110)   ondansetron (ZOFRAN) injection 4 mg (4 mg Intravenous $Given 4/29/24 0900)   morphine (PF) injection 4 mg (4 mg Intravenous $Given 4/29/24 0900)       NEW PRESCRIPTIONS STARTED AT TODAY'S ER VISIT  New Prescriptions    No medications on file        I, Leandra Lou, am serving as a scribe to document services personally performed by Simon Sullivan D.O., based on my observations and the provider's statements to me.  I, Simon Sullivan D.O., attest that Leandra Lou is acting in a scribe capacity, has observed my performance of the services and has documented them in accordance with my direction.      Simon Sullivan D.O.  Emergency Medicine  [facility] EMERGENCY DEPARTMENT  [facility] [facility] [id number] [phone]  Dept: [phone]       Simon Sullivan,   04/29/24 1132

## 2024-04-29 NOTE — CONSULTS
General surgery consult         ASSESSMENT     1. Calculus of gallbladder without cholecystitis without obstruction             PLAN      Laparocopic Cholecystectomy         CHIEF COMPLAINT     Chief Complaint   Patient presents with    Abdominal Pain         RICHARD Abbott is a 33 year old female who presents with RUQ pain and gall stones on US with signs of an acute cholecystitis.  She has had RUQ pain for the last 3 days.  The pain gets worse when she eats.            PAST MEDICAL HISTORY SURGICAL HISTORY     Past Medical History:   Diagnosis Date    Postpartum hemorrhage 02/11/2018    No past surgical history on file.       CURRENT MEDICATIONS ALLERGIES     Current Outpatient Rx   Medication Sig Dispense Refill    etonogestrel (NEXPLANON) 68 MG IMPL 1 each (68 mg) by Subdermal route once      ibuprofen (ADVIL/MOTRIN) 600 MG tablet Take 1 tablet (600 mg) by mouth daily as needed for moderate pain 50 tablet 2    omeprazole (PRILOSEC) 20 MG DR capsule Take 20 mg by mouth daily      SUMAtriptan (IMITREX) 25 MG tablet Take 1 tablet (25 mg) by mouth at onset of headache for migraine May repeat in 2 hours. Max 8 tablets/24 hours. 20 tablet 3    No Known Allergies       FAMILY HISTORY   No family history on file.      SOCIAL HISTORY     Social History     Socioeconomic History    Marital status:    Tobacco Use    Smoking status: Never     Passive exposure: Never    Smokeless tobacco: Never    Tobacco comments:     no passive exposure   Vaping Use    Vaping status: Never Used   Substance and Sexual Activity    Alcohol use: No    Drug use: No    Sexual activity: Yes     Partners: Male     Social Determinants of Health     Financial Resource Strain: Low Risk  (3/28/2024)    Financial Resource Strain     Within the past 12 months, have you or your family members you live with been unable to get utilities (heat, electricity) when it was really needed?: No   Food Insecurity: Low Risk  (3/28/2024)    Food  Insecurity     Within the past 12 months, did you worry that your food would run out before you got money to buy more?: No     Within the past 12 months, did the food you bought just not last and you didn t have money to get more?: No   Transportation Needs: Low Risk  (3/28/2024)    Transportation Needs     Within the past 12 months, has lack of transportation kept you from medical appointments, getting your medicines, non-medical meetings or appointments, work, or from getting things that you need?: No   Physical Activity: Inactive (3/28/2024)    Exercise Vital Sign     Days of Exercise per Week: 0 days     Minutes of Exercise per Session: 0 min   Stress: No Stress Concern Present (3/28/2024)    [employer] [place] [employer] - Occupational Stress Questionnaire     Feeling of Stress : Not at all   Social Connections: Unknown (3/28/2024)    Social Connection and Isolation Panel [NHANES]     Frequency of Social Gatherings with Friends and Family: Twice a week   Interpersonal Safety: Low Risk  (3/28/2024)    Interpersonal Safety     Do you feel physically and emotionally safe where you currently live?: Yes     Within the past 12 months, have you been hit, slapped, kicked or otherwise physically hurt by someone?: No     Within the past 12 months, have you been humiliated or emotionally abused in other ways by your partner or ex-partner?: No   Housing Stability: Low Risk  (3/28/2024)    Housing Stability     Do you have housing? : Yes     Are you worried about losing your housing?: No         REVIEW OF SYSTEMS       12 point review of systems are unremarkable except for the symptoms described in the HPI    PHYSICAL EXAM     VITAL SIGNS: /80 (BP Location: Right arm, Patient Position: Semi-Anthony's, Cuff Size: Adult Small)   Pulse 72   Temp 98.2  F (36.8  C) (Oral)   Resp 16   LMP  (LMP Unknown)   SpO2 98%    General : Alert, cooperative, appears stated age   Skin: Skin color, texture, turgor normal,  no rashes or lesions   Lymph nodes: No obvious adenopathy   Head: Normocephalic, without obvious abnormality, atraumatic   HEENT:  conjunctiva/corneas clear, EOM's intact, no scleral icterus   Throat: Lips, mucosa, and tongue normal; teeth and gums normal    Neck: Supple, thyroid not enlarged   Back: No CVA tenderness   Lungs: Clear to auscultation bilaterally, respirations unlabored, no rales, rhonchi or wheezes   Chest Wall:No tenderness or deformity   Heart: Regular rate and rhythm, S1, S2 normal, no murmur, rub or gallop   Abdomen: Soft, tender to palp in the RUQ, no guarding, + bowel sounds active,   Extremities : No obvious swelling,  Neurologic: Cranial Nerves II-XII normal, moves all extremities equally, no focal findings          RADIOLOGY      US Abdomen Limited   Final Result   IMPRESSION:   1.  Cholelithiasis with gallbladder wall thickening likely reflecting chronic rather than acute cholecystitis. A nonmobile stone is noted within the gallbladder neck. If the patient's symptoms persist or worsen, short interval follow-up ultrasound    imaging or further evaluation with a hepatobiliary nuclear medicine scan may be helpful.   2.  Hepatic steatosis.   3.  Borderline extra hepatic biliary ductal dilatation. No sonographic evidence for choledocholithiasis. Follow-up with MRCP may be helpful.   4.  Simple right renal cyst. No follow-up is indicated.                EKG     Reviewed        LABS     Results for orders placed or performed during the hospital encounter of 04/29/24   US Abdomen Limited    Impression    IMPRESSION:  1.  Cholelithiasis with gallbladder wall thickening likely reflecting chronic rather than acute cholecystitis. A nonmobile stone is noted within the gallbladder neck. If the patient's symptoms persist or worsen, short interval follow-up ultrasound   imaging or further evaluation with a hepatobiliary nuclear medicine scan may be helpful.  2.  Hepatic steatosis.  3.  Borderline extra  hepatic biliary ductal dilatation. No sonographic evidence for choledocholithiasis. Follow-up with MRCP may be helpful.  4.  Simple right renal cyst. No follow-up is indicated.       Extra Red Top Tube   Result Value Ref Range    Hold Specimen JIC    Extra Green Top (Lithium Heparin) Tube   Result Value Ref Range    Hold Specimen JIC    Extra Purple Top Tube   Result Value Ref Range    Hold Specimen JIC    Basic metabolic panel   Result Value Ref Range    Sodium 141 135 - 145 mmol/L    Potassium 3.9 3.4 - 5.3 mmol/L    Chloride 104 98 - 107 mmol/L    Carbon Dioxide (CO2) 26 22 - 29 mmol/L    Anion Gap 11 7 - 15 mmol/L    Urea Nitrogen 12.7 6.0 - 20.0 mg/dL    Creatinine 0.53 0.51 - 0.95 mg/dL    GFR Estimate >90 >60 mL/min/1.73m2    Calcium 9.6 8.6 - 10.0 mg/dL    Glucose 101 (H) 70 - 99 mg/dL   Hepatic function panel   Result Value Ref Range    Protein Total 7.8 6.4 - 8.3 g/dL    Albumin 4.3 3.5 - 5.2 g/dL    Bilirubin Total 0.2 <=1.2 mg/dL    Alkaline Phosphatase 118 40 - 150 U/L    AST 19 0 - 45 U/L    ALT 20 0 - 50 U/L    Bilirubin Direct <0.20 0.00 - 0.30 mg/dL   Result Value Ref Range    Lipase 23 13 - 60 U/L   UA with Microscopic reflex to Culture    Specimen: Urine, Clean Catch   Result Value Ref Range    Color Urine Light Yellow Colorless, Straw, Light Yellow, Yellow    Appearance Urine Clear Clear    Glucose Urine Negative Negative mg/dL    Bilirubin Urine Negative Negative    Ketones Urine Negative Negative mg/dL    Specific Gravity Urine 1.021 1.001 - 1.030    Blood Urine Negative Negative    pH Urine 7.0 5.0 - 7.0    Protein Albumin Urine Negative Negative mg/dL    Urobilinogen Urine <2.0 <2.0 mg/dL    Nitrite Urine Negative Negative    Leukocyte Esterase Urine 75 [place]/uL (A) Negative    Mucus Urine Present (A) None Seen /LPF    RBC Urine 1 <=2 /HPF    WBC Urine 4 <=5 /HPF    Squamous Epithelials Urine <1 <=1 /HPF    Transitional Epithelials Urine <1 <=1 /HPF   CBC with platelets and differential    Result Value Ref Range    WBC Count 9.4 4.0 - 11.0 10e3/uL    RBC Count 4.60 3.80 - 5.20 10e6/uL    Hemoglobin 12.8 11.7 - 15.7 g/dL    Hematocrit 39.0 35.0 - 47.0 %    MCV 85 78 - 100 fL    MCH 27.8 26.5 - 33.0 pg    MCHC 32.8 31.5 - 36.5 g/dL    RDW 13.5 10.0 - 15.0 %    Platelet Count 327 150 - 450 10e3/uL    % Neutrophils 77 %    % Lymphocytes 17 %    % Monocytes 5 %    % Eosinophils 1 %    % Basophils 0 %    % Immature Granulocytes 0 %    NRBCs per 100 WBC 0 <1 /100    Absolute Neutrophils 7.2 1.6 - 8.3 10e3/uL    Absolute Lymphocytes 1.6 0.8 - 5.3 10e3/uL    Absolute Monocytes 0.5 0.0 - 1.3 10e3/uL    Absolute Eosinophils 0.1 0.0 - 0.7 10e3/uL    Absolute Basophils 0.0 0.0 - 0.2 10e3/uL    Absolute Immature Granulocytes 0.0 <=0.4 10e3/uL    Absolute NRBCs 0.0 10e3/uL           [facility]  [phone]

## 2024-04-29 NOTE — PHARMACY-ADMISSION MEDICATION HISTORY
Pharmacist Admission Medication History    Admission medication history is complete. The information provided in this note is only as accurate as the sources available at the time of the update.    Information Source(s): Patient and CareEverywhere/SureScripts via phone    Pertinent Information: Completed med hx via , patient pulled out bottle of OTC omeprazole from purse that she reported taking    Changes made to PTA medication list:  Added: Omeprazole  Deleted: None  Changed: None    Allergies reviewed with patient and updates made in EHR: yes    Medication History Completed By: DIGNA HANSON [facility] 4/29/2024 11:52 AM    PTA Med List   Medication Sig Last Dose    etonogestrel (NEXPLANON) 68 MG IMPL 1 each (68 mg) by Subdermal route once More than a month at placed on 04/26/2023    ibuprofen (ADVIL/MOTRIN) 600 MG tablet Take 1 tablet (600 mg) by mouth daily as needed for moderate pain Unknown at prn    omeprazole (PRILOSEC) 20 MG DR capsule Take 20 mg by mouth daily 4/28/2024 at am    SUMAtriptan (IMITREX) 25 MG tablet Take 1 tablet (25 mg) by mouth at onset of headache for migraine May repeat in 2 hours. Max 8 tablets/24 hours. Unknown at prn

## 2024-05-01 LAB
PATH REPORT.COMMENTS IMP SPEC: NORMAL
PATH REPORT.COMMENTS IMP SPEC: NORMAL
PATH REPORT.FINAL DX SPEC: NORMAL
PATH REPORT.GROSS SPEC: NORMAL
PATH REPORT.MICROSCOPIC SPEC OTHER STN: NORMAL
PATH REPORT.RELEVANT HX SPEC: NORMAL
PHOTO IMAGE: NORMAL

## 2024-07-29 NOTE — ANESTHESIA POSTPROCEDURE EVALUATION
Preoperative Brain Exercises    What are brain exercises?  A brain exercise is any activity that engages your thinking (cognitive) skills.    What types of activities are considered brain exercises?  Jigsaw puzzles, crossword puzzles, word jumble, memory games, word search, and many more.  Many can be found free online or on your phone via a mobile ismael.    Why should I do brain exercises before my surgery?  More recent research has shown brain exercise before surgery can lower the risk of postoperative delirium (confusion) which can be especially important for older adults.  Patients who did brain exercises for 5 to 10 hours the days before surgery, cut their risk of postoperative delirium in half up to 1 week after surgery.                 [place] Merc[place] [facility]    Procedure: Procedure(s):  CHOLECYSTECTOMY, LAPAROSCOPIC       Anesthesia Type:  General    Note:     Postop Pain Control: Uneventful            Sign Out: Well controlled pain   PONV: No   Neuro/Psych: Uneventful            Sign Out: Acceptable/Baseline neuro status   Airway/Respiratory: Uneventful            Sign Out: Acceptable/Baseline resp. status   CV/Hemodynamics: Uneventful            Sign Out: Acceptable CV status; No obvious hypovolemia; No obvious fluid overload   Other NRE: NONE   DID A NON-ROUTINE EVENT OCCUR? No           Last vitals:  Vitals Value Taken Time   /77 04/29/24 1845   Temp 37.1  C (98.8  F) 04/29/24 1830   Pulse 85 04/29/24 1852   Resp 18 04/29/24 1852   SpO2 96 % 04/29/24 1852   Vitals shown include unfiled device data.    Electronically Signed By: Jose Duque MD  April 29, 2024  6:54 PM

## 2025-06-12 ENCOUNTER — TELEPHONE (OUTPATIENT)
Dept: FAMILY MEDICINE | Facility: CLINIC | Age: 35
End: 2025-06-12
Payer: COMMERCIAL

## 2025-06-12 NOTE — TELEPHONE ENCOUNTER
Patient Quality Outreach    Patient is due for the following:   Physical Preventive Adult Physical      Topic Date Due    COVID-19 Vaccine (3 - 2024-25 season) 09/01/2024       Action(s) Taken:   Schedule a Adult Preventative    Type of outreach:    Phone, spoke to patient/parent. Appt schedule.     Questions for provider review:    None         Oskar Hammer MA  Chart routed to None.

## 2025-07-14 ENCOUNTER — OFFICE VISIT (OUTPATIENT)
Dept: FAMILY MEDICINE | Facility: CLINIC | Age: 35
End: 2025-07-14
Payer: COMMERCIAL

## 2025-07-14 VITALS
HEIGHT: 62 IN | OXYGEN SATURATION: 100 % | SYSTOLIC BLOOD PRESSURE: 109 MMHG | BODY MASS INDEX: 28.67 KG/M2 | HEART RATE: 84 BPM | WEIGHT: 155.8 LBS | RESPIRATION RATE: 20 BRPM | DIASTOLIC BLOOD PRESSURE: 77 MMHG | TEMPERATURE: 98.5 F

## 2025-07-14 DIAGNOSIS — Z00.00 ROUTINE GENERAL MEDICAL EXAMINATION AT A HEALTH CARE FACILITY: Primary | ICD-10-CM

## 2025-07-14 DIAGNOSIS — L83 ACANTHOSIS NIGRICANS: ICD-10-CM

## 2025-07-14 DIAGNOSIS — R42 VERTIGO: ICD-10-CM

## 2025-07-14 LAB
ALBUMIN SERPL BCG-MCNC: 4.3 G/DL (ref 3.5–5.2)
ALP SERPL-CCNC: 96 U/L (ref 40–150)
ALT SERPL W P-5'-P-CCNC: 17 U/L (ref 0–50)
ANION GAP SERPL CALCULATED.3IONS-SCNC: 9 MMOL/L (ref 7–15)
AST SERPL W P-5'-P-CCNC: 23 U/L (ref 0–45)
BASOPHILS # BLD AUTO: 0 10E3/UL (ref 0–0.2)
BASOPHILS NFR BLD AUTO: 0 %
BILIRUB SERPL-MCNC: 0.2 MG/DL
BUN SERPL-MCNC: 13.5 MG/DL (ref 6–20)
CALCIUM SERPL-MCNC: 9.1 MG/DL (ref 8.8–10.4)
CHLORIDE SERPL-SCNC: 106 MMOL/L (ref 98–107)
CHOLEST SERPL-MCNC: 148 MG/DL
CREAT SERPL-MCNC: 0.62 MG/DL (ref 0.51–0.95)
EGFRCR SERPLBLD CKD-EPI 2021: >90 ML/MIN/1.73M2
EOSINOPHIL # BLD AUTO: 0.1 10E3/UL (ref 0–0.7)
EOSINOPHIL NFR BLD AUTO: 2 %
ERYTHROCYTE [DISTWIDTH] IN BLOOD BY AUTOMATED COUNT: 13.1 % (ref 10–15)
EST. AVERAGE GLUCOSE BLD GHB EST-MCNC: 97 MG/DL
FASTING STATUS PATIENT QL REPORTED: NO
FASTING STATUS PATIENT QL REPORTED: NO
GLUCOSE SERPL-MCNC: 122 MG/DL (ref 70–99)
HBA1C MFR BLD: 5 % (ref 0–5.6)
HCO3 SERPL-SCNC: 24 MMOL/L (ref 22–29)
HCT VFR BLD AUTO: 41 % (ref 35–47)
HDLC SERPL-MCNC: 37 MG/DL
HGB BLD-MCNC: 13.4 G/DL (ref 11.7–15.7)
IMM GRANULOCYTES # BLD: 0 10E3/UL
IMM GRANULOCYTES NFR BLD: 0 %
LDLC SERPL CALC-MCNC: 90 MG/DL
LYMPHOCYTES # BLD AUTO: 2.5 10E3/UL (ref 0.8–5.3)
LYMPHOCYTES NFR BLD AUTO: 39 %
MCH RBC QN AUTO: 27.5 PG (ref 26.5–33)
MCHC RBC AUTO-ENTMCNC: 32.7 G/DL (ref 31.5–36.5)
MCV RBC AUTO: 84 FL (ref 78–100)
MONOCYTES # BLD AUTO: 0.6 10E3/UL (ref 0–1.3)
MONOCYTES NFR BLD AUTO: 9 %
NEUTROPHILS # BLD AUTO: 3.2 10E3/UL (ref 1.6–8.3)
NEUTROPHILS NFR BLD AUTO: 50 %
NONHDLC SERPL-MCNC: 111 MG/DL
PLATELET # BLD AUTO: 280 10E3/UL (ref 150–450)
POTASSIUM SERPL-SCNC: 4.3 MMOL/L (ref 3.4–5.3)
PROT SERPL-MCNC: 7.7 G/DL (ref 6.4–8.3)
RBC # BLD AUTO: 4.87 10E6/UL (ref 3.8–5.2)
SODIUM SERPL-SCNC: 139 MMOL/L (ref 135–145)
TRIGL SERPL-MCNC: 106 MG/DL
TSH SERPL DL<=0.005 MIU/L-ACNC: 2.23 UIU/ML (ref 0.3–4.2)
WBC # BLD AUTO: 6.4 10E3/UL (ref 4–11)

## 2025-07-14 PROCEDURE — 99395 PREV VISIT EST AGE 18-39: CPT | Performed by: FAMILY MEDICINE

## 2025-07-14 PROCEDURE — 84443 ASSAY THYROID STIM HORMONE: CPT | Performed by: FAMILY MEDICINE

## 2025-07-14 PROCEDURE — 36415 COLL VENOUS BLD VENIPUNCTURE: CPT | Performed by: FAMILY MEDICINE

## 2025-07-14 PROCEDURE — 3048F LDL-C <100 MG/DL: CPT | Performed by: FAMILY MEDICINE

## 2025-07-14 PROCEDURE — 99213 OFFICE O/P EST LOW 20 MIN: CPT | Mod: 25 | Performed by: FAMILY MEDICINE

## 2025-07-14 PROCEDURE — 83036 HEMOGLOBIN GLYCOSYLATED A1C: CPT | Performed by: FAMILY MEDICINE

## 2025-07-14 PROCEDURE — 3074F SYST BP LT 130 MM HG: CPT | Performed by: FAMILY MEDICINE

## 2025-07-14 PROCEDURE — 80053 COMPREHEN METABOLIC PANEL: CPT | Performed by: FAMILY MEDICINE

## 2025-07-14 PROCEDURE — 80061 LIPID PANEL: CPT | Performed by: FAMILY MEDICINE

## 2025-07-14 PROCEDURE — 3078F DIAST BP <80 MM HG: CPT | Performed by: FAMILY MEDICINE

## 2025-07-14 PROCEDURE — 85025 COMPLETE CBC W/AUTO DIFF WBC: CPT | Performed by: FAMILY MEDICINE

## 2025-07-14 PROCEDURE — 3044F HG A1C LEVEL LT 7.0%: CPT | Performed by: FAMILY MEDICINE

## 2025-07-14 SDOH — HEALTH STABILITY: PHYSICAL HEALTH: ON AVERAGE, HOW MANY DAYS PER WEEK DO YOU ENGAGE IN MODERATE TO STRENUOUS EXERCISE (LIKE A BRISK WALK)?: 1 DAY

## 2025-07-14 SDOH — HEALTH STABILITY: PHYSICAL HEALTH: ON AVERAGE, HOW MANY MINUTES DO YOU ENGAGE IN EXERCISE AT THIS LEVEL?: 10 MIN

## 2025-07-14 ASSESSMENT — SOCIAL DETERMINANTS OF HEALTH (SDOH): HOW OFTEN DO YOU GET TOGETHER WITH FRIENDS OR RELATIVES?: MORE THAN THREE TIMES A WEEK

## 2025-07-14 NOTE — PATIENT INSTRUCTIONS
Patient Education   You have been provided the Preventive Care Advice document.    Additional copies can be found here: [email or web address]  Preventive Care Advice   This is general advice given by our system to help you stay healthy. However, your care team may have specific advice just for you. Please talk to your care team about your preventive care needs.  Nutrition  Eat 5 or more servings of fruits and vegetables each day.  Try wheat bread, brown rice and whole grain pasta (instead of white bread, rice, and pasta).  Get enough calcium and vitamin D. Check the label on foods and aim for 100% of the RDA (recommended daily allowance).  Lifestyle  Exercise at least 150 minutes each week  (30 minutes a day, 5 days a week).  Do muscle strengthening activities 2 days a week. These help control your weight and prevent disease.  No smoking.  Wear sunscreen to prevent skin cancer.  Have a dental exam and cleaning every 6 months.  Yearly exams  See your health care team every year to talk about:  Any changes in your health.  Any medicines your care team has prescribed.  Preventive care, family planning, and ways to prevent chronic diseases.  Shots (vaccines)   HPV shots (up to age 26), if you've never had them before.  Hepatitis B shots (up to age 59), if you've never had them before.  COVID-19 shot: Get this shot when it's due.  Flu shot: Get a flu shot every year.  Tetanus shot: Get a tetanus shot every 10 years.  Pneumococcal, hepatitis A, and RSV shots: Ask your care team if you need these based on your risk.  Shingles shot (for age 50 and up)  General health tests  Diabetes screening:  Starting at age 35, Get screened for diabetes at least every 3 years.  If you are younger than age 35, ask your care team if you should be screened for diabetes.  Cholesterol test: At age 39, start having a cholesterol test every 5 years, or more often if advised.  Bone density scan (DEXA): At age 50, ask your care team if you  should have this scan for osteoporosis (brittle bones).  Hepatitis C: Get tested at least once in your life.  STIs (sexually transmitted infections)  Before age 24: Ask your care team if you should be screened for STIs.  After age 24: Get screened for STIs if you're at risk. You are at risk for STIs (including HIV) if:  You are sexually active with more than one person.  You don't use condoms every time.  You or a partner was diagnosed with a sexually transmitted infection.  If you are at risk for HIV, ask about PrEP medicine to prevent HIV.  Get tested for HIV at least once in your life, whether you are at risk for HIV or not.  Cancer screening tests  Cervical cancer screening: If you have a cervix, begin getting regular cervical cancer screening tests starting at age 21.  Breast cancer scan (mammogram): If you've ever had breasts, begin having regular mammograms starting at age 40. This is a scan to check for breast cancer.  Colon cancer screening: It is important to start screening for colon cancer at age 45.  Have a colonoscopy test every 10 years (or more often if you're at risk) Or, ask your provider about stool tests like a FIT test every year or Cologuard test every 3 years.  To learn more about your testing options, visit:   .  For help making a decision, visit:   https://bit.ly/pu87113.  Prostate cancer screening test: If you have a prostate, ask your care team if a prostate cancer screening test (PSA) at age 55 is right for you.  Lung cancer screening: If you are a current or former smoker ages 50 to 80, ask your care team if ongoing lung cancer screenings are right for you.  For informational purposes only. Not to replace the advice of your health care provider. Copyright   2023 [employer]. All rights reserved. Clinically reviewed by the [facility] Transitions Program. [employer] [id number] - REV 01/24.

## 2025-07-14 NOTE — PROGRESS NOTES
Preventive Care Visit  [facility] LAYLA Morales MD, Family Medicine  Jul 14, 2025  {Provider  Link to SmartSet :311719}    {PROVIDER CHARTING PREFERENCE:937919}    Subjective   Harman is a 34 year old, presenting for the following:  Physical and Dizziness        7/14/2025    11:03 AM   Additional Questions   Roomed by paw p   Accompanied by self          HPI    Nexplanon 4/2023 - amenorrhea with this  Pap 2021 - neg co-test repeat 5 years  Breast feeding    Dizziness - can occur at any time. Gets worse in the car, whether driving or riding. Experiences a movement sensation/spinning. Has had this for a long time, but recently more frequent. Sometimes associated w nausea. On ave, occurs 1-2x/week, but more often if she is in car. Can be triggered by sudden head movements.     Denies ear symptoms such as decreased hearing, fullness, tinnitus, pain.     Denies symptoms of allergies.     Burning eyes a couple years - has been to eye clinic, got drops, not sure they helped.       {MA/LPN/RN Pre-Provider Visit Orders- hCG/UA/Strep (Optional):652131}  {SUPERLIST (Optional):643088}  {additonal problems for provider to add (Optional):495799}  Advance Care Planning  {The storyboard will display whether the patient has ACP docs on file. Hover over the Code section in the storyboard to access the ACP documents. :720920}  Discussed advance care planning with patient; however, patient declined at this time.        7/14/2025   General Health   How would you rate your overall physical health? Good   Feel stress (tense, anxious, or unable to sleep) Not at all         7/14/2025   Nutrition   Three or more servings of calcium each day? Yes   Diet: Regular (no restrictions)   How many servings of fruit and vegetables per day? (!) 2-3   How many sweetened beverages each day? 0-1         7/14/2025   Exercise   Days per week of moderate/strenous exercise 1 day   Average minutes spent exercising at this level 10 min    (!) EXERCISE CONCERN      7/14/2025   Social Factors   Frequency of gathering with friends or relatives More than three times a week   Worry food won't last until get money to buy more No   Food not last or not have enough money for food? No   Do you have housing? (Housing is defined as stable permanent housing and does not include staying outside in a car, in a tent, in an abandoned building, in an overnight shelter, or couch-surfing.) No   Are you worried about losing your housing? No   Lack of transportation? No   Unable to get utilities (heat,electricity)? No   Want help with housing or utility concern? No   (!) HOUSING CONCERN PRESENT      7/14/2025   Dental   Dentist two times every year? (!) NO         Today's PHQ-2 Score:       7/14/2025    10:47 AM   PHQ-2 ( 1999 [employer])   Q1: Little interest or pleasure in doing things 0   Q2: Feeling down, depressed or hopeless 0   PHQ-2 Score 0    Q1: Little interest or pleasure in doing things Not at all   Q2: Feeling down, depressed or hopeless Not at all   PHQ-2 Score 0       Patient-reported           7/14/2025   Substance Use   Alcohol more than 3/day or more than 7/wk Not Applicable   Do you use any other substances recreationally? No     Social History     Tobacco Use    Smoking status: Never     Passive exposure: Never    Smokeless tobacco: Never    Tobacco comments:     no passive exposure   Vaping Use    Vaping status: Never Used   Substance Use Topics    Alcohol use: No    Drug use: No     {Provider  If there are gaps in the social history shown above, please follow the link to update and then refresh the note Link to Social and Substance History :351022}     {Mammogram Decision Support (Optional):660875}        7/14/2025   STI Screening   New sexual partner(s) since last STI/HIV test? No     History of abnormal Pap smear: No - age 30-64 HPV with reflex Pap every 5 years recommended        Latest Ref Rng & Units 8/17/2021     3:23 PM 3/27/2018     4:55 PM  "4/2/2015    10:30 AM   PAP / HPV   PAP  Negative for Intraepithelial Lesion or Malignancy (NILM)  Negative for squamous intraepithelial lesion or malignancy  Electronically signed by Feli Fierro CT (ASCP) on 3/28/2018 at  2:45 PM    Negative for squamous intraepithelial lesion or malignancy  Electronically signed by Adriana Sanchez CT (ASCP) on 4/9/2015 at 11:57 AM      HPV 16 DNA Negative Negative      HPV 18 DNA Negative Negative      Other HR HPV Negative Negative              7/14/2025   Contraception/Family Planning   Questions about contraception or family planning No   What are your periods like? Not currently having periods     {Provider  REQUIRED FOR AWV Use the storyboard to review patient history, after sections have been marked as reviewed, refresh note to capture documentation:568204}   Reviewed and updated as needed this visit by Provider                    {HISTORY OPTIONS (Optional):442952}    {ROS Picklists (Optional):486419}     Objective    Exam  /77   Pulse 84   Temp 98.5  F (36.9  C) (Oral)   Resp 20   Ht 1.569 m (5' 1.77\")   Wt 70.7 kg (155 lb 12.8 oz)   SpO2 100%   BMI 28.71 kg/m     Estimated body mass index is 28.71 kg/m  as calculated from the following:    Height as of this encounter: 1.569 m (5' 1.77\").    Weight as of this encounter: 70.7 kg (155 lb 12.8 oz).    Physical Exam  GENERAL: alert and no distress  EYES: Eyes grossly normal to inspection, PERRL and conjunctivae and sclerae normal  HENT: ear canals and TM's normal, nose and mouth without ulcers or lesions  NECK: no adenopathy, no asymmetry, masses, or scars  RESP: lungs clear to auscultation - no rales, rhonchi or wheezes  CV: regular rate and rhythm, normal S1 S2, no S3 or S4, no murmur, click or rub, no peripheral edema  ABDOMEN: soft, nontender, no hepatosplenomegaly, no masses and bowel sounds normal  MS: no gross musculoskeletal defects noted, no edema  NEURO: Normal strength and tone, " mentation intact and speech normal        Signed Electronically by: Adriana Morales MD  {Email feedback regarding this note to [email or web address]   :391651}

## 2025-07-15 ENCOUNTER — RESULTS FOLLOW-UP (OUTPATIENT)
Dept: FAMILY MEDICINE | Facility: CLINIC | Age: 35
End: 2025-07-15
Payer: COMMERCIAL

## 2025-07-15 NOTE — TELEPHONE ENCOUNTER
"Writer attempt #1 to call patient with the help of an [facility] \"Nabila\"   regarding clinician's message below. Clinician's message relayed to patient.    Patient verbalizes understanding, agrees with plan and has no further questions.    Sameera Pérez, BRIELLEN, RN, PHN   [facility]    "

## (undated) DEVICE — Device

## (undated) DEVICE — DRSG TELFA 3X4" 1050

## (undated) DEVICE — ENDO TROCAR FIRST ENTRY KII FIOS Z-THRD 05X100MM CTF03

## (undated) DEVICE — ENDO TROCAR FIRST ENTRY KII FIOS Z-THRD 11X100MM CTF33

## (undated) DEVICE — CLIP LIGACLIP LG YELLOW LT400

## (undated) DEVICE — GOWN XXL 9575

## (undated) DEVICE — BASIN EMESIS STERILE  SSK9005A

## (undated) DEVICE — SUCTION MANIFOLD NEPTUNE 2 SYS 1 PORT 702-025-000

## (undated) DEVICE — SU MONOCRYL+ 4-0 18IN PS2 UND MCP496G

## (undated) DEVICE — MITT PRE-OP 7 L X 5 1/2 W 5177M1

## (undated) DEVICE — ANTIFOG SOLUTION SEE SHARP 150M TROCAR SWABS 30978

## (undated) DEVICE — ENDO POUCH UNIV RETRIEVAL SYSTEM INZII 10MM CD001

## (undated) DEVICE — SOL RINGERS LACTATED 1000ML BAG 2B2324X

## (undated) DEVICE — GLOVE BIOGEL PI ULTRATOUCH G SZ 8.0 42180

## (undated) DEVICE — ENDO SHEARS RENEW LAP ENDOCUT SCISSOR TIP 16.5MM 3142

## (undated) DEVICE — ENDO TROCAR SLEEVE KII Z-THREADED 05X100MM CTS02

## (undated) DEVICE — DECANTER VIAL 2006S

## (undated) DEVICE — SU VICRYL+ 0 27 UR6 VLT VCP603H

## (undated) DEVICE — PREP CHLORAPREP 26ML TINTED HI-LITE ORANGE 930815

## (undated) DEVICE — CLIP APPLIER ENDO ROTATING 10MM MED/LG ER320

## (undated) DEVICE — ESU GROUND PAD ADULT REM W/15' CORD E7507DB

## (undated) DEVICE — SOL WATER IRRIG 1000ML BOTTLE 2F7114

## (undated) DEVICE — SUCTION STRYKERFLOW II 250-070-500

## (undated) DEVICE — TUBING SMOKE EVAC PNEUMOCLEAR HIGH FLOW 0620050250

## (undated) DEVICE — CUSTOM PACK LAP CHOLE SBA5BLCHEA

## (undated) DEVICE — DRSG TEGADERM 2 3/8X2 3/4" 1624W

## (undated) DEVICE — BLADE KNIFE SURG 11 371111

## (undated) RX ORDER — DEXAMETHASONE SODIUM PHOSPHATE 10 MG/ML
INJECTION, SOLUTION INTRAMUSCULAR; INTRAVENOUS
Status: DISPENSED
Start: 2024-04-29

## (undated) RX ORDER — PROPOFOL 10 MG/ML
INJECTION, EMULSION INTRAVENOUS
Status: DISPENSED
Start: 2024-04-29

## (undated) RX ORDER — BUPIVACAINE HYDROCHLORIDE AND EPINEPHRINE 2.5; 5 MG/ML; UG/ML
INJECTION, SOLUTION EPIDURAL; INFILTRATION; INTRACAUDAL; PERINEURAL
Status: DISPENSED
Start: 2024-04-29

## (undated) RX ORDER — FENTANYL CITRATE 50 UG/ML
INJECTION, SOLUTION INTRAMUSCULAR; INTRAVENOUS
Status: DISPENSED
Start: 2024-04-29

## (undated) RX ORDER — ONDANSETRON 2 MG/ML
INJECTION INTRAMUSCULAR; INTRAVENOUS
Status: DISPENSED
Start: 2024-04-29

## (undated) RX ORDER — LIDOCAINE HYDROCHLORIDE 10 MG/ML
INJECTION, SOLUTION EPIDURAL; INFILTRATION; INTRACAUDAL; PERINEURAL
Status: DISPENSED
Start: 2024-04-29